# Patient Record
Sex: MALE | Race: WHITE | NOT HISPANIC OR LATINO | Employment: FULL TIME | URBAN - METROPOLITAN AREA
[De-identification: names, ages, dates, MRNs, and addresses within clinical notes are randomized per-mention and may not be internally consistent; named-entity substitution may affect disease eponyms.]

---

## 2020-06-02 ENCOUNTER — TRANSCRIBE ORDERS (OUTPATIENT)
Dept: ADMINISTRATIVE | Facility: HOSPITAL | Age: 35
End: 2020-06-02

## 2020-06-02 ENCOUNTER — HOSPITAL ENCOUNTER (OUTPATIENT)
Dept: RADIOLOGY | Facility: HOSPITAL | Age: 35
Discharge: HOME | End: 2020-06-02
Payer: COMMERCIAL

## 2020-06-02 DIAGNOSIS — M79.89 SWELLING OF LIMB: ICD-10-CM

## 2020-06-02 DIAGNOSIS — M79.89 SWELLING OF LIMB: Primary | ICD-10-CM

## 2020-06-02 PROCEDURE — 73140 X-RAY EXAM OF FINGER(S): CPT

## 2020-07-23 ENCOUNTER — OFFICE VISIT (OUTPATIENT)
Dept: URGENT CARE | Facility: CLINIC | Age: 35
End: 2020-07-23
Payer: COMMERCIAL

## 2020-07-23 VITALS
BODY MASS INDEX: 21.73 KG/M2 | HEART RATE: 84 BPM | HEIGHT: 71 IN | DIASTOLIC BLOOD PRESSURE: 60 MMHG | WEIGHT: 155.2 LBS | SYSTOLIC BLOOD PRESSURE: 106 MMHG | RESPIRATION RATE: 18 BRPM | OXYGEN SATURATION: 99 % | TEMPERATURE: 98 F

## 2020-07-23 DIAGNOSIS — J02.9 SORE THROAT: ICD-10-CM

## 2020-07-23 DIAGNOSIS — R07.89 CHEST TIGHTNESS: Primary | ICD-10-CM

## 2020-07-23 LAB — S PYO AG THROAT QL: NEGATIVE

## 2020-07-23 PROCEDURE — 87880 STREP A ASSAY W/OPTIC: CPT | Performed by: PHYSICIAN ASSISTANT

## 2020-07-23 PROCEDURE — 99202 OFFICE O/P NEW SF 15 MIN: CPT | Performed by: PHYSICIAN ASSISTANT

## 2020-07-23 PROCEDURE — U0003 INFECTIOUS AGENT DETECTION BY NUCLEIC ACID (DNA OR RNA); SEVERE ACUTE RESPIRATORY SYNDROME CORONAVIRUS 2 (SARS-COV-2) (CORONAVIRUS DISEASE [COVID-19]), AMPLIFIED PROBE TECHNIQUE, MAKING USE OF HIGH THROUGHPUT TECHNOLOGIES AS DESCRIBED BY CMS-2020-01-R: HCPCS | Performed by: PHYSICIAN ASSISTANT

## 2020-07-23 RX ORDER — DOXYCYCLINE HYCLATE 100 MG
TABLET ORAL 2 TIMES DAILY
COMMUNITY
Start: 2020-07-23 | End: 2022-03-18 | Stop reason: ALTCHOICE

## 2020-07-23 RX ORDER — UREA 10 %
250 LOTION (ML) TOPICAL DAILY
COMMUNITY
End: 2022-03-18 | Stop reason: ALTCHOICE

## 2020-07-23 RX ORDER — CETIRIZINE HYDROCHLORIDE 10 MG/1
10 TABLET ORAL DAILY PRN
COMMUNITY

## 2020-07-23 RX ORDER — LOTEPREDNOL ETABONATE 3.8 MG/G
GEL OPHTHALMIC 3 TIMES DAILY
COMMUNITY
Start: 2020-07-22 | End: 2022-03-18 | Stop reason: ALTCHOICE

## 2020-07-23 NOTE — PATIENT INSTRUCTIONS
Please remain at home in quarantine until you receive the results of your COVID-19 testing and further instruction given  While at home you should isolate from others  You may take Tylenol for fever and discomfort  Get plenty of rest and drink lots of water  Delsym or Mucinex DM may be taken for cough and congestion relief  Chloraseptic spray, saltwater gargles, warm tea with honey, and throat lozenges may be relieving of throat discomfort  Call for your test results, which should be back in 7-10 days  Follow up with your family doctor in 3-5 days  Proceed to the ER if symptoms worsen  If you need to go to the ER please notify them of your arrival and wait in your car until further instruction is given  If you need to call 911 please notify the  of current suspicion for COVID-19

## 2020-07-23 NOTE — PROGRESS NOTES
[facility]        NAME: Inge Bejarano is a 30 y o  male  : 1985    MRN: [id number]  DATE: 2020  TIME: 6:10 PM    Assessment and Plan   Sore throat [J02 9]  1  Sore throat  POCT rapid strepA   2  Chest tightness  MISC COVID-19 TEST- Office Collection     Rapid strep negative in office  COVID-19 swab obtained  Quarantine measures reviewed  Reviewed supportive therapies  Instructed to call for results  Work note provided  All questions answered  Precautions given  Patient verbalized understanding and agreement with the plan  Patient Instructions     Please remain at home in quarantine until you receive the results of your COVID-19 testing and further instruction given  While at home you should isolate from others  You may take Tylenol for fever and discomfort  Get plenty of rest and drink lots of water  Delsym or Mucinex DM may be taken for cough and congestion relief  Chloraseptic spray, saltwater gargles, warm tea with honey, and throat lozenges may be relieving of throat discomfort  Call for your test results, which should be back in 7-10 days  Follow up with your family doctor in 3-5 days  Proceed to the ER if symptoms worsen  If you need to go to the ER please notify them of your arrival and wait in your car until further instruction is given  If you need to call 911 please notify the  of current suspicion for COVID-19  Chief Complaint     Chief Complaint   Patient presents with    Sore Throat     Had sore throat since   Today started with chills and sl  chest tightness with deep breath with body aches and MOORE  Took Advil at 8 am for temp 100  Denies  cough, SOB, loss of taste or smell  or N/V or diarrhea   Started Doxycycline today for potential Lyme's disease - had 6 deer ticks imbedded 2020    Generalized Body Aches         History of Present Illness       45-year-old male accompanied by his wife presenting with complaint chest tightness since yesterday  States he awoke yesterday morning with chills and fever, T-max 100 degrees  Reports malaise, generalized body aches, and headache  States he has had a sore throat for the past 5 days has been constant  No swelling or difficulty swallowing  Has been treating for discomfort symptoms with Advil with some relief  States he started taking doxycycline 100 milligrams b i d  For concern for lyme disease, reporting he had multiple tick bites roughly 11 days ago  However, his wife developed similar flu like symptoms today and is now concerned for COVID-19  He denies any sweats, fatigue, shortness of breath, wheezing, cough, abdominal pains, nausea, vomiting, or diarrhea  No recent travel  No other sick contacts  Review of Systems   Review of Systems   Constitutional: Positive for chills and fever  Negative for diaphoresis and fatigue  HENT: Positive for sore throat  Negative for congestion, ear pain and rhinorrhea  Respiratory: Positive for chest tightness  Negative for cough, shortness of breath and wheezing  Cardiovascular: Negative for chest pain and palpitations  Gastrointestinal: Negative for abdominal pain, diarrhea, nausea and vomiting  Musculoskeletal: Positive for myalgias  Neurological: Positive for headaches  Negative for dizziness       Current Medications       Current Outpatient Medications:     cetirizine (ZyrTEC) 10 mg tablet, Take 10 mg by mouth daily as needed for allergies, Disp: , Rfl:     doxycycline hyclate (VIBRA-TABS) 100 mg tablet, 2 (two) times a day, Disp: , Rfl:     LOTEMAX SM 0 38 % GEL, Administer to both eyes 3 (three) times a day, Disp: , Rfl:     magnesium gluconate (MAGONATE) 500 mg tablet, Take 250 mg by mouth daily, Disp: , Rfl:     Current Allergies     Allergies as of 07/23/2020    (No Known Allergies)            The following portions of the patient's history were reviewed and updated as appropriate: allergies, current medications, past family history, past medical history, past social history, past surgical history and problem list      Past Medical History:   Diagnosis Date    Allergic rhinitis     Frequent headaches        Past Surgical History:   Procedure Laterality Date    WISDOM TOOTH EXTRACTION         No family history on file  Medications have been verified  Objective   /60   Pulse 84   Temp 98 °F (36 7 °C)   Resp 18   Ht 5' 10 75" (1 797 m)   Wt 70 4 kg (155 lb 3 2 oz)   SpO2 99%   BMI 21 80 kg/m²      Rapid strep:  Negative  Physical Exam     Physical Exam   Constitutional: Vital signs are normal  He appears well-developed and well-nourished  He is cooperative  He does not appear ill  No distress  HENT:   Head: Normocephalic and atraumatic  Right Ear: Hearing, tympanic membrane, external ear and ear canal normal    Left Ear: Hearing, tympanic membrane, external ear and ear canal normal    Nose: Nose normal    Mouth/Throat: Uvula is midline, oropharynx is clear and moist and mucous membranes are normal  Mucous membranes are not pale, not dry and not cyanotic  No oral lesions  No uvula swelling  No oropharyngeal exudate, posterior oropharyngeal edema, posterior oropharyngeal erythema or tonsillar abscesses  Eyes: Conjunctivae and lids are normal  Right eye exhibits no discharge and no exudate  Left eye exhibits no discharge and no exudate  Neck: Trachea normal and phonation normal  Neck supple  No tracheal tenderness present  No neck rigidity  No edema and no erythema present  Cardiovascular: Normal rate, regular rhythm and normal heart sounds  Exam reveals no distant heart sounds  Pulmonary/Chest: Effort normal and breath sounds normal  No stridor  No respiratory distress  He has no decreased breath sounds  He has no wheezes  He has no rhonchi  He has no rales  Lymphadenopathy:     He has no cervical adenopathy  Neurological: He is alert  He is not disoriented  No cranial nerve deficit  Coordination and gait normal    Skin: Skin is warm, dry and intact  No rash noted  He is not diaphoretic  No erythema  No pallor  Psychiatric: He has a normal mood and affect  His behavior is normal  Judgment and thought content normal    Nursing note and vitals reviewed

## 2020-07-23 NOTE — LETTER
July 23, 2020     Patient: Leai Maldonado   YOB: 1985   Date of Visit: 7/23/2020       To Whom It May Concern: It is my medical opinion that Shirley Quiroga should remain out of work until cleared by a physician  If you have any questions or concerns, please don't hesitate to call           Sincerely,        Shaun Higgins PA-C

## 2020-07-26 LAB — SARS-COV-2 RNA SPEC QL NAA+PROBE: NOT DETECTED

## 2021-03-10 DIAGNOSIS — Z23 ENCOUNTER FOR IMMUNIZATION: ICD-10-CM

## 2021-12-15 ENCOUNTER — EVALUATION (OUTPATIENT)
Dept: PHYSICAL THERAPY | Facility: CLINIC | Age: 36
End: 2021-12-15
Payer: COMMERCIAL

## 2021-12-15 DIAGNOSIS — M54.2 NECK PAIN: Primary | ICD-10-CM

## 2021-12-15 PROCEDURE — 97162 PT EVAL MOD COMPLEX 30 MIN: CPT | Performed by: PHYSICAL THERAPIST

## 2021-12-22 ENCOUNTER — OFFICE VISIT (OUTPATIENT)
Dept: PHYSICAL THERAPY | Facility: CLINIC | Age: 36
End: 2021-12-22
Payer: COMMERCIAL

## 2021-12-22 DIAGNOSIS — M54.2 NECK PAIN: Primary | ICD-10-CM

## 2021-12-22 PROCEDURE — 97140 MANUAL THERAPY 1/> REGIONS: CPT | Performed by: PHYSICAL THERAPIST

## 2021-12-22 PROCEDURE — 97110 THERAPEUTIC EXERCISES: CPT | Performed by: PHYSICAL THERAPIST

## 2021-12-29 ENCOUNTER — OFFICE VISIT (OUTPATIENT)
Dept: PHYSICAL THERAPY | Facility: CLINIC | Age: 36
End: 2021-12-29
Payer: COMMERCIAL

## 2021-12-29 DIAGNOSIS — M54.2 NECK PAIN: Primary | ICD-10-CM

## 2021-12-29 PROCEDURE — 97140 MANUAL THERAPY 1/> REGIONS: CPT | Performed by: PHYSICAL THERAPIST

## 2021-12-29 PROCEDURE — 97110 THERAPEUTIC EXERCISES: CPT | Performed by: PHYSICAL THERAPIST

## 2022-01-05 ENCOUNTER — OFFICE VISIT (OUTPATIENT)
Dept: PHYSICAL THERAPY | Facility: CLINIC | Age: 37
End: 2022-01-05
Payer: COMMERCIAL

## 2022-01-05 DIAGNOSIS — M54.2 NECK PAIN: Primary | ICD-10-CM

## 2022-01-05 PROCEDURE — 97110 THERAPEUTIC EXERCISES: CPT | Performed by: PHYSICAL THERAPIST

## 2022-01-05 PROCEDURE — 97140 MANUAL THERAPY 1/> REGIONS: CPT | Performed by: PHYSICAL THERAPIST

## 2022-01-05 NOTE — PROGRESS NOTES
Daily Note     Today's date: 2022  Patient name: Tory Ruiz  : 1985  MRN: [id number]  Referring provider: Kitty Vo MD  Dx:   Encounter Diagnosis     ICD-10-CM    1  Neck pain  M54 2                   Subjective: Pain after last session increased and was sore for a few days following  He feels traction was possibly the cause of the increased pain  He also notes that each morning when he wakes up following sleeping on his side, his neck is really stiff and sore  Objective: See treatment diary below      Assessment: Tolerated treatment well  Patient would benefit from continued PT  He would benefit from C-S extension re-training  Plan: Continue per plan of care  Progress treatment as tolerated  Assess CT junction       Precautions: - none    Specialty Daily Treatment Diary     Manuals 12/15/21 12/22/21 12/29/21 1/5/22    Visit # 1 2 3 4    STM UT, paraspinals, levator  6' 6' 7'    C-S PROM  3' 3' 3'    C-S distraction  3' 3' 2'    T-S P to A        Warm-up          10' 5' 10'    Neuro Re-Ed        Posture correct        UT stretch  10 ea 10 ea     Chin tucks 20 20 20 20    S/L rotation 10 10      Flex iso    20    Ther Ex        TB row  20    BTB  30   GTB    TB ext  20   BTB  30   GTB    Shrugs   20   4# 30   4#    Towel SNAGs   20 20     LS stretch pulleys   10 ea 10    SCM stretch supine   10 ea ---                    Ther Activity                                Modalities        Traction to C-S  10' max 25#  min 10# 8' max 30#  Min 15# ---

## 2022-01-11 ENCOUNTER — OFFICE VISIT (OUTPATIENT)
Dept: PHYSICAL THERAPY | Facility: CLINIC | Age: 37
End: 2022-01-11
Payer: COMMERCIAL

## 2022-01-11 DIAGNOSIS — M54.2 NECK PAIN: Primary | ICD-10-CM

## 2022-01-11 PROCEDURE — 97140 MANUAL THERAPY 1/> REGIONS: CPT | Performed by: PHYSICAL THERAPIST

## 2022-01-11 PROCEDURE — 97110 THERAPEUTIC EXERCISES: CPT | Performed by: PHYSICAL THERAPIST

## 2022-01-11 NOTE — PROGRESS NOTES
Daily Note     Today's date: 2022  Patient name: Tawny Montana  : 1985  MRN: [id number]  Referring provider: Lucio David MD  Dx:   Encounter Diagnosis     ICD-10-CM    1  Neck pain  M54 2                   Subjective: He reports feeling improved following last session  He has mild symptoms today  Objective: See treatment diary below      Assessment: Tolerated treatment well  Patient would benefit from continued PT  He has a hinge point in cervical region at C4  He also has significant paraspinal tightness surrounding this segment      Plan: Continue per plan of care  Progress treatment as tolerated  FOTO next session        Precautions: - none    Specialty Daily Treatment Diary     Manuals 12/15/21 12/22/21 12/29/21 1/5/22 1/10/22   Visit # 1 2 3 4 5   STM UT, paraspinals, levator  6' 6' 7' 7'   C-S PROM  3' 3' 3' 3'   C-S distraction  3' 3' 2' 2'   T-S P to A        Warm-up          10' 5' 10' 10'   Neuro Re-Ed        Posture correct        UT stretch  10 ea 10 ea     Chin tucks 20 20 20 20 20 w lift off in supine   S/L rotation 10 10      Flex iso    20 20   Ther Ex        TB row  20    BTB  30   GTB 30  GTB   TB ext  20   BTB  30   GTB 30  GTB   Shrugs   20   4# 30   4# 30   5#   Towel SNAGs   20 20     LS stretch pulleys   10 ea 10 --   SCM stretch supine   10 ea ---                    Ther Activity                                Modalities        Traction to C-S  10' max 25#  min 10# 8' max 30#  Min 15# --- --

## 2022-01-14 ENCOUNTER — OFFICE VISIT (OUTPATIENT)
Dept: PHYSICAL THERAPY | Facility: CLINIC | Age: 37
End: 2022-01-14
Payer: COMMERCIAL

## 2022-01-14 DIAGNOSIS — M54.2 NECK PAIN: Primary | ICD-10-CM

## 2022-01-14 PROCEDURE — 97140 MANUAL THERAPY 1/> REGIONS: CPT | Performed by: PHYSICAL THERAPIST

## 2022-01-14 PROCEDURE — 97110 THERAPEUTIC EXERCISES: CPT | Performed by: PHYSICAL THERAPIST

## 2022-01-14 NOTE — PROGRESS NOTES
Daily Note     Today's date: 2022  Patient name: Jelani Noland  : 1985  MRN: [id number]  Referring provider: Greg Boykin MD  Dx:   Encounter Diagnosis     ICD-10-CM    1  Neck pain  M54 2                   Subjective: He reports driving golf balls recently and having a headache following a few hits  Objective: See treatment diary below      Assessment: Tolerated treatment well  Patient would benefit from continued PT  He positioned himself with good hip hinge but had to flex his C-S to look at golf ball  Adjusted patient into mild squat stance which allowed hime to see ball and not flex C-S  Plan: Continue per plan of care  Progress treatment as tolerated           Precautions: - none    Specialty Daily Treatment Diary     Manuals 22       Visit # 6       STM UT, paraspinals, levator 7'       C-S PROM 3'       C-S distraction 2'       T-S P to A        Warm-up         10'       Neuro Re-Ed        Posture correct        UT stretch        Chin tucks with lift off in supine 20       S/L rotation        Flex iso        Ther Ex        TB row 20   BTB       TB ext 20   BTB       Shrugs        Towel SNAGs         LS stretch pulleys        SCM stretch supine                        Ther Activity        Golf swing mechanics 8'                       Modalities        Traction to C-S  10' max 25#  min 10# 8' max 30#  Min 15# --- --

## 2022-01-18 ENCOUNTER — APPOINTMENT (OUTPATIENT)
Dept: PHYSICAL THERAPY | Facility: CLINIC | Age: 37
End: 2022-01-18
Payer: COMMERCIAL

## 2022-01-21 ENCOUNTER — APPOINTMENT (OUTPATIENT)
Dept: PHYSICAL THERAPY | Facility: CLINIC | Age: 37
End: 2022-01-21
Payer: COMMERCIAL

## 2022-01-25 ENCOUNTER — OFFICE VISIT (OUTPATIENT)
Dept: PHYSICAL THERAPY | Facility: CLINIC | Age: 37
End: 2022-01-25
Payer: COMMERCIAL

## 2022-01-25 DIAGNOSIS — M54.2 NECK PAIN: Primary | ICD-10-CM

## 2022-01-25 PROCEDURE — 97140 MANUAL THERAPY 1/> REGIONS: CPT | Performed by: PHYSICAL THERAPIST

## 2022-01-25 PROCEDURE — 97110 THERAPEUTIC EXERCISES: CPT | Performed by: PHYSICAL THERAPIST

## 2022-01-25 NOTE — PROGRESS NOTES
Daily Note     Today's date: 2022  Patient name: Marcos Baldwin  : 1985  MRN: [id number]  Referring provider: Estefany Dowd MD  Dx:   Encounter Diagnosis     ICD-10-CM    1  Neck pain  M54 2                   Subjective: He reports no significant pain today  He is sleeping better due to not using a pillow  Objective: See treatment diary below      Assessment: Tolerated treatment well  Patient would benefit from continued PT  C-S PROM normalizing well  Plan: Continue per plan of care  Progress treatment as tolerated           Precautions: - none    Specialty Daily Treatment Diary     Manuals 22      Visit # 6 7      STM UT, paraspinals, levator 7' 7'      C-S PROM 3' 3'      C-S distraction 2' 2'      T-S P to A        Warm-up         10' 10'      Neuro Re-Ed        Posture correct        UT stretch        Chin tucks with lift off in supine 20 20      S/L rotation        Flex iso        Ther Ex        TB row 20   BTB 20 green tube      TB ext 20   BTB 20 green tube      Shrugs  20  10#      Towel SNAGs         LS stretch pulleys        SCM stretch supine        Over head press  20  9# kb              Ther Activity        Golf swing mechanics 8'                       Modalities        Traction to C-S  --

## 2022-01-27 ENCOUNTER — OFFICE VISIT (OUTPATIENT)
Dept: PHYSICAL THERAPY | Facility: CLINIC | Age: 37
End: 2022-01-27
Payer: COMMERCIAL

## 2022-01-27 DIAGNOSIS — M54.2 NECK PAIN: Primary | ICD-10-CM

## 2022-01-27 PROCEDURE — 97140 MANUAL THERAPY 1/> REGIONS: CPT | Performed by: PHYSICAL THERAPIST

## 2022-01-27 PROCEDURE — 97110 THERAPEUTIC EXERCISES: CPT | Performed by: PHYSICAL THERAPIST

## 2022-01-27 NOTE — PROGRESS NOTES
Daily Note     Today's date: 2022  Patient name: Randa Rouse  : 1985  MRN: [id number]  Referring provider: Mattie Locke MD  Dx:   Encounter Diagnosis     ICD-10-CM    1  Neck pain  M54 2                   Subjective: He reports no C-S pain  Some stiffness still as he wakes up  Objective: See treatment diary below      Assessment: Tolerated treatment well  Patient would benefit from continued PT  He had a good posterior C-S stretch with posture correction and chin tuck  He agreed to add this to his HEP  Plan: Continue per plan of care  Progress treatment as tolerated           Precautions: - none    Specialty Daily Treatment Diary     Manuals 22     Visit # 6 7 8     STM UT, paraspinals, levator 7' 7' 7'     C-S PROM 3' 3' 3'     C-S distraction 2' 2' 2'     T-S P to A   2'     Warm-up         10' 10' 10'     Neuro Re-Ed        Posture correct        UT stretch        Chin tucks with lift off in supine 20 20 20 at wall with posture correction / brace     S/L rotation        Flex iso        Ther Ex        TB row 20   BTB 20 green tube 20  GTB  H ABD     TB ext 20   BTB 20 green tube 20  GTB     Shrugs  20  10# 20  15#     Towel SNAGs         LS stretch pulleys        SCM stretch supine        Over head press  20  9# kb              Ther Activity        Golf swing mechanics 8'                       Modalities        Traction to C-S  --

## 2022-02-01 ENCOUNTER — OFFICE VISIT (OUTPATIENT)
Dept: PHYSICAL THERAPY | Facility: CLINIC | Age: 37
End: 2022-02-01
Payer: COMMERCIAL

## 2022-02-01 DIAGNOSIS — M54.2 NECK PAIN: Primary | ICD-10-CM

## 2022-02-01 PROCEDURE — 97110 THERAPEUTIC EXERCISES: CPT | Performed by: PHYSICAL THERAPIST

## 2022-02-01 PROCEDURE — 97140 MANUAL THERAPY 1/> REGIONS: CPT | Performed by: PHYSICAL THERAPIST

## 2022-02-01 NOTE — PROGRESS NOTES
Daily Note     Today's date: 2022  Patient name: Duane Lea  : 1985  MRN: [id number]  Referring provider: Jigar Castellanos MD  Dx:   Encounter Diagnosis     ICD-10-CM    1  Neck pain  M54 2                   Subjective: He reports waking up stiff and sore in his C-S today  Pain is 1/10  He also notes feeling left upper shoulder / neck pinching when he picks up his young son  Objective: See treatment diary below      Assessment: Tolerated treatment well  Patient would benefit from continued PT  Haroon Delgadillo had increased left sided pain/ tightness at C-S shoulder junction  Performed first rib depression mob with improvement in restriction and pain following  Plan: Continue per plan of care  Progress treatment as tolerated  He is attempting to drive golf balls tonight  Assess if a mild squat in his driving stance helps C-S symptoms       Precautions: - none    Specialty Daily Treatment Diary     Manuals 22    Visit # 6 7 8 9    STM UT, paraspinals, levator 7' 7' 7' 7'    C-S PROM 3' 3' 3' 3'    C-S distraction 2' 2' 2' 2'    T-S P to A   2' 2'    Warm-up         10' 10' 10' 10'    Neuro Re-Ed        Posture correct        UT stretch        Chin tucks with lift off in supine 20 20 20 at wall with posture correction / brace 20    S/L rotation        Flex iso        Ther Ex        TB row 20   BTB 20 green tube 20  GTB  H ABD     TB ext 20   BTB 20 green tube 20  GTB     Shrugs  20  10# 20  15# 20  15#    Towel SNAGs         Open books w TB    20  RTB    SCM stretch supine        Over head press  20  9# kb              Ther Activity        Golf swing mechanics 8'                       Modalities        Traction to C-S  --

## 2022-02-04 ENCOUNTER — OFFICE VISIT (OUTPATIENT)
Dept: PHYSICAL THERAPY | Facility: CLINIC | Age: 37
End: 2022-02-04
Payer: COMMERCIAL

## 2022-02-04 DIAGNOSIS — M54.2 NECK PAIN: Primary | ICD-10-CM

## 2022-02-04 PROCEDURE — 97140 MANUAL THERAPY 1/> REGIONS: CPT | Performed by: PHYSICAL THERAPIST

## 2022-02-04 PROCEDURE — 97110 THERAPEUTIC EXERCISES: CPT | Performed by: PHYSICAL THERAPIST

## 2022-02-04 NOTE — PROGRESS NOTES
Daily Note     Today's date: 2022  Patient name: Duane Lea  : 1985  MRN: [id number]  Referring provider: Jigar Castellanos MD  Dx:   Encounter Diagnosis     ICD-10-CM    1  Neck pain  M54 2                   Subjective: He reports feeling good today but having some soreness the last few days  He admits this may be from golfing on Tuesday but notes he was able to golf pain free  Objective: See treatment diary below      Assessment: Tolerated treatment well  Patient would benefit from continued PT  Added extension of T-S and C-S in prone with a press up  He completed this without pain  Plan: Continue per plan of care  Progress treatment as tolerated           Precautions: - none    Specialty Daily Treatment Diary     Manuals 22   Visit # 6 7 8 9 10   STM UT, paraspinals, levator 7' 7' 7' 7' 7'   C-S PROM 3' 3' 3' 3' 3'   C-S distraction 2' 2' 2' 2' 2'   T-S P to A   2' 2' 2'   Warm-up         10' 10' 10' 10' 10'   Neuro Re-Ed        Posture correct        UT stretch        Chin tucks with lift off in supine 20 20 20 at wall with posture correction / brace 20 20   S/L rotation        Flex iso        Ther Ex        TB row 20   BTB 20 green tube 20  GTB  H ABD  20  GTB   TB ext 20   BTB 20 green tube 20  GTB  20  GTB   Shrugs  20  10# 20  15# 20  15# 20  15#   Towel SNAGs         Open books w TB    20  RTB    SCM stretch supine     Prone P-ups 10x   Over head press  20  9# kb              Ther Activity        Golf swing mechanics 8'                       Modalities        Traction to C-S  --

## 2022-02-08 ENCOUNTER — OFFICE VISIT (OUTPATIENT)
Dept: PHYSICAL THERAPY | Facility: CLINIC | Age: 37
End: 2022-02-08
Payer: COMMERCIAL

## 2022-02-08 DIAGNOSIS — M54.2 NECK PAIN: Primary | ICD-10-CM

## 2022-02-08 PROCEDURE — 97110 THERAPEUTIC EXERCISES: CPT | Performed by: PHYSICAL THERAPIST

## 2022-02-08 PROCEDURE — 97140 MANUAL THERAPY 1/> REGIONS: CPT | Performed by: PHYSICAL THERAPIST

## 2022-02-08 NOTE — PROGRESS NOTES
Daily Note     Today's date: 2022  Patient name: Duane Lea  : 1985  MRN: [id number]  Referring provider: Jigar Castellanos MD  Dx:   Encounter Diagnosis     ICD-10-CM    1  Neck pain  M54 2                   Subjective: No complaints of neck pain today      Objective: See treatment diary below      Assessment: Tolerated treatment well  Patient would benefit from continued PT  Haroon Delgadillo has remaining forward shoulder posture which stays in an anterior position when he is lying supine  Plan: Continue per plan of care  Progress treatment as tolerated      Add pupine pec stretches and pec minor stretch in door way     Precautions: - none    Specialty Daily Treatment Diary     Manuals 22       Visit # 11       STM UT, paraspinals, levator 7'       C-S PROM 3'       C-S distraction 2'       T-S P to A 2'       Warm-up        MH 10'       Neuro Re-Ed        Chin tucks with lift off in supine        S/L rotation                Ther Ex        TB row 20   BTB       TB ext 20   BTB       Shrugs        Pec stretch supine        Open books w TB HABD  20x   GTB       Pec stretch in doorway        Over head press                Ther Activity        Golf swing mechanics                        Modalities

## 2022-02-10 ENCOUNTER — OFFICE VISIT (OUTPATIENT)
Dept: PHYSICAL THERAPY | Facility: CLINIC | Age: 37
End: 2022-02-10
Payer: COMMERCIAL

## 2022-02-10 DIAGNOSIS — M54.2 NECK PAIN: Primary | ICD-10-CM

## 2022-02-10 PROCEDURE — 97140 MANUAL THERAPY 1/> REGIONS: CPT | Performed by: PHYSICAL THERAPIST

## 2022-02-10 PROCEDURE — 97110 THERAPEUTIC EXERCISES: CPT | Performed by: PHYSICAL THERAPIST

## 2022-02-10 NOTE — PROGRESS NOTES
Daily Note     Today's date: 2/10/2022  Patient name: Tyree Segura  : 1985  MRN: [id number]  Referring provider: Jluis Davies MD  Dx:   Encounter Diagnosis     ICD-10-CM    1  Neck pain  M54 2                   Subjective: He had one episode of C-S stiffness and there fore soreness when he woke up earlier this week  Objective: See treatment diary below      Assessment: Tolerated treatment well  Patient would benefit from continued PT  Making steady progress toward goals  Plan: Continue per plan of care  Progress treatment as tolerated           Precautions: - none    Specialty Daily Treatment Diary     Manuals 2/8/22 2/10/22      Visit # 11 12      STM UT, paraspinals, levator 7' 7'      C-S PROM 3' 3'      C-S distraction 2' 2'      T-S P to A 2' 2'      Warm-up         10' 10'      Neuro Re-Ed        Chin tucks with lift off in supine        S/L rotation                Ther Ex        TB row 20   BTB 20   BTB      TB ext 20   BTB 20   BTB      Shrugs  20  15# db      Pec stretch supine        Open books w TB HABD  20x   GTB HABD  20x   GTB      Pec stretch in doorway        Over head press          Cat/Camel 10x      Ther Activity        Golf swing mechanics                        Modalities

## 2022-02-15 ENCOUNTER — OFFICE VISIT (OUTPATIENT)
Dept: PHYSICAL THERAPY | Facility: CLINIC | Age: 37
End: 2022-02-15
Payer: COMMERCIAL

## 2022-02-15 DIAGNOSIS — M54.2 NECK PAIN: Primary | ICD-10-CM

## 2022-02-15 PROCEDURE — 97140 MANUAL THERAPY 1/> REGIONS: CPT | Performed by: PHYSICAL THERAPIST

## 2022-02-15 PROCEDURE — 97110 THERAPEUTIC EXERCISES: CPT | Performed by: PHYSICAL THERAPIST

## 2022-02-15 NOTE — PROGRESS NOTES
Daily Note     Today's date: 2/15/2022  Patient name: Suhail Juares  : 1985  MRN: [id number]  Referring provider: Garrick Santa MD  Dx:   Encounter Diagnosis     ICD-10-CM    1  Neck pain  M54 2                   Subjective: He had pain this morning when he woke up  Objective: See treatment diary below      Assessment: Tolerated treatment well  Patient would benefit from continued PT  He had soreness with side bend left, side glide right  He also had soreness with combined shoulder depression and retraction  He agreed to add pec minor stretch into HEP    Plan: Continue per plan of care  Progress treatment as tolerated           Precautions: - none    Specialty Daily Treatment Diary     Manuals 2/8/22 2/10/22 2/15/22     Visit # 11 12 13     STM UT, paraspinals, levator 7' 7' 7'     C-S PROM 3' 3' 3'     C-S distraction 2' 2' 2'     T-S P to A 2' 2' 2'     Warm-up         10' 10' 10'     Neuro Re-Ed        Chin tucks with lift off in supine   20     S/L rotation                Ther Ex        TB row 20   BTB 20   BTB      TB ext 20   BTB 20   BTB      Shrugs  20  15# db 20  15#     Pec stretch supine        Open books w TB HABD  20x   GTB HABD  20x   GTB 20     Pec stretch in doorway   10 x 10"     Over head press   20  5#       Cat/Camel 10x      Ther Activity        Golf swing mechanics                        Modalities

## 2022-02-18 ENCOUNTER — OFFICE VISIT (OUTPATIENT)
Dept: PHYSICAL THERAPY | Facility: CLINIC | Age: 37
End: 2022-02-18
Payer: COMMERCIAL

## 2022-02-18 DIAGNOSIS — M54.2 NECK PAIN: Primary | ICD-10-CM

## 2022-02-18 PROCEDURE — 97140 MANUAL THERAPY 1/> REGIONS: CPT | Performed by: PHYSICAL THERAPIST

## 2022-02-18 PROCEDURE — 97110 THERAPEUTIC EXERCISES: CPT | Performed by: PHYSICAL THERAPIST

## 2022-02-18 NOTE — PROGRESS NOTES
Daily Note     Today's date: 2022  Patient name: Surendra López  : 1985  MRN: [id number]  Referring provider: Paola Miguel MD  Dx:   Encounter Diagnosis     ICD-10-CM    1  Neck pain  M54 2                   Subjective: A little pain yesterday      Objective: See treatment diary below      Assessment: Tolerated treatment well  Patient would benefit from continued PT  He is able to feel a good stretch with postural correction and Habd of arms  Plan: Continue per plan of care  Progress treatment as tolerated       Attempt more active program next session     Precautions: - none    Specialty Daily Treatment Diary     Manuals 2/8/22 2/10/22 2/15/22 2/17/22    Visit # 11 12 13 14    STM UT, paraspinals, levator 7' 7' 7' 7'    C-S PROM 3' 3' 3' 3'    C-S distraction 2' 2' 2' 2'    T-S P to A 2' 2' 2' 2'    Warm-up         10' 10' 10' 10'    Neuro Re-Ed        Chin tucks with lift off in supine   20 20    S/L rotation                Ther Ex        TB row 20   BTB 20   BTB      TB ext 20   BTB 20   BTB      Shrugs  20  15# db 20  15# 20  15#    Pec stretch supine    HAbd  RTB  15x    Open books w TB HABD  20x   GTB HABD  20x   GTB 20     Pec stretch in doorway   10 x 10"     Over head press   20  5#       Cat/Camel 10x  20    Ther Activity        Golf swing mechanics            LPD in high kneel 27 5#  20x            Modalities

## 2022-02-22 ENCOUNTER — OFFICE VISIT (OUTPATIENT)
Dept: PHYSICAL THERAPY | Facility: CLINIC | Age: 37
End: 2022-02-22
Payer: COMMERCIAL

## 2022-02-22 DIAGNOSIS — M54.2 NECK PAIN: Primary | ICD-10-CM

## 2022-02-22 PROCEDURE — 97110 THERAPEUTIC EXERCISES: CPT

## 2022-02-22 PROCEDURE — 97140 MANUAL THERAPY 1/> REGIONS: CPT

## 2022-02-22 PROCEDURE — 97112 NEUROMUSCULAR REEDUCATION: CPT

## 2022-02-22 NOTE — PROGRESS NOTES
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 and anticipates that he will be sore after doing this  Objective: See treatment diary below; performed P-A thoracic glide grade 5 after receiving verbal informed consent from pt; pt presented with cavitation in the mid-thoracic segment; presented with lasting hypomobility in 1-2 segments above    Assessment: Pt tolerated treatment well  Pt performed active warm up and tolerated well, without additional pain or soreness in cervical musculature  Pt performed functional activities include shoulder ER with arm abducted to 90° as well as simulated golf swing  Pt also performed farmer's carries with verbal cuing for scap retraction  Pt noted after performing shrugging and carries, his posterior neck was sore  Plan: Continue per plan of care  Progress treatment as tolerated            Precautions: - none    Specialty Daily Treatment Diary     Manuals 2/8/22 2/10/22 2/15/22 2/17/22 2/22/22   Visit # 11 12 13 14 15   STM UT, paraspinals, levator 7' 7' 7' 7' 7'   C-S PROM 3' 3' 3' 3' 3'   C-S distraction 2' 2' 2' 2' 2'   T-S P to A 2' 2' 2' 2' Test glide followed by gr 5 for 3'   Warm-up        MH 10' 10' 10' 10' Nustep 10'    Neuro Re-Ed        Chin tucks with lift off in supine   20 20 20x    S/L rotation        Hubbard's carry with scap retract     50 ft x 2 (15# DB x 2)    Ther Ex        TB row 20   BTB 20   BTB   20x 12 5# (NMR)    TB ext 20   BTB 20   BTB   20x 9# (NMR)   Shrugs  20  15# db 20  15# 20  15# 20x 15#   Pec stretch supine    HAbd  RTB  15x HAbd GTB 20x supine (NMR)   Open books w TB HABD  20x   GTB HABD  20x   GTB 20 Pec stretch in doorway   10 x 10"     Over head press   20  5#       Cat/Camel 10x  20    Shoulder ER arm at 90°     20x 4#                    Ther Activity        Golf swing mechanics     W/ CC 2 5# 2x10        LPD in high kneel 27 5#  20x            Modalities

## 2022-02-24 ENCOUNTER — OFFICE VISIT (OUTPATIENT)
Dept: PHYSICAL THERAPY | Facility: CLINIC | Age: 37
End: 2022-02-24
Payer: COMMERCIAL

## 2022-02-24 DIAGNOSIS — M54.2 NECK PAIN: Primary | ICD-10-CM

## 2022-02-24 PROCEDURE — 97140 MANUAL THERAPY 1/> REGIONS: CPT | Performed by: PHYSICAL THERAPIST

## 2022-02-24 PROCEDURE — 97110 THERAPEUTIC EXERCISES: CPT | Performed by: PHYSICAL THERAPIST

## 2022-02-24 NOTE — PROGRESS NOTES
Daily Note      Today's date: 2022  Patient name: Stevie Horne  : 1985  MRN: [id number]  Referring provider: Sirena Juárez MD  Dx:   Encounter Diagnosis     ICD-10-CM    1  Neck pain  M54 2        Subjective: Pt reports that he feels at least 75% better overall  He returns to work next week and feels ready to perform exercises independently at home    Objective: See treatment diary below    Assessment: Amber Lazcano has met all PT goals at this time    Plan: Discontinue PT at this time  Precautions: - none    Specialty Daily Treatment Diary     Manuals 2/10/22 2/15/22 2/17/22 2/22/22 2/24/22   Visit # 12 13 14 15 16   STM UT, paraspinals, levator 7' 7' 7' 7' 7'   C-S PROM 3' 3' 3' 3' 3'   C-S distraction 2' 2' 2' 2' 2'   T-S P to A 2' 2' 2' Test glide followed by gr 5 for 3' T-S ext P to A mob 2'   Warm-up         10' 10' 10' Nustep 10'  5'   Neuro Re-Ed        Chin tucks with lift off in supine  20 20 20x  20   S/L rotation        Hubbard's carry with scap retract    50 ft x 2 (15# DB x 2)     Ther Ex        TB row 20   BTB   20x 12 5# (NMR)     TB ext 20   BTB   20x 9# (NMR)    Shrugs 20  15# db 20  15# 20  15# 20x 15#    Pec stretch supine   HAbd  RTB  15x HAbd GTB 20x supine (NMR)    Open books w TB HABD  20x   GTB 20   20   Pec stretch in doorway  10 x 10"   10   Over head press  20  5#   CC Chops 20  7 5#    Cat/Camel 10x  20     Shoulder ER arm at 90°    20x 4#  Ball throws  20        Bat swings  20           Ther Activity        Golf swing mechanics    W/ CC 2 5# 2x10        LPD in high kneel 27 5#  20x             Modalities             5' MH post                Access Code: [email or web address]  URL: [email or web address]  Date: 2022  Prepared by:  Bridgette Jacobsen    Exercises  · Standing Bilateral Low Shoulder Row with Anchored Resistance - 1 x daily - 3 sets - 10 reps  · Shoulder Extension with Resistance - 1 x daily - 3 sets - 10 reps  · Standing Shoulder Shrugs - 1 x daily - 3 sets - 10 reps  · Doorway Pec Stretch at 60 Elevation - 1 x daily - 3 sets - 10 reps  · Quadruped Thoracic Rotation - Reach Under - 1 x daily - 1 sets - 10 reps  · Quadruped Cat Camel - 1 x daily - 3 sets - 10 reps  · Supine Deep Neck Flexor Training - Repetitions - 1 x daily - 3 sets - 10 reps  · Seated Cervical Sidebending Stretch - 1 x daily - 1 sets - 10 reps

## 2022-02-24 NOTE — PROGRESS NOTES
PT Discharge    Patient has done well with PT  He feels at least 75% better overall  Patient has achieved all goals at this time and will continue to perform HEP independently  He was given copies of the exercises and a thera-band  D/C at this time

## 2022-02-25 ENCOUNTER — APPOINTMENT (OUTPATIENT)
Dept: PHYSICAL THERAPY | Facility: CLINIC | Age: 37
End: 2022-02-25
Payer: COMMERCIAL

## 2022-03-01 ENCOUNTER — APPOINTMENT (OUTPATIENT)
Dept: RADIOLOGY | Facility: CLINIC | Age: 37
End: 2022-03-01
Payer: COMMERCIAL

## 2022-03-01 ENCOUNTER — OFFICE VISIT (OUTPATIENT)
Dept: OBGYN CLINIC | Facility: CLINIC | Age: 37
End: 2022-03-01
Payer: COMMERCIAL

## 2022-03-01 VITALS
DIASTOLIC BLOOD PRESSURE: 79 MMHG | HEART RATE: 73 BPM | TEMPERATURE: 97.5 F | BODY MASS INDEX: 23.1 KG/M2 | HEIGHT: 71 IN | SYSTOLIC BLOOD PRESSURE: 117 MMHG | WEIGHT: 165 LBS

## 2022-03-01 DIAGNOSIS — M54.2 NECK PAIN: ICD-10-CM

## 2022-03-01 DIAGNOSIS — M54.2 CHRONIC NECK PAIN: Primary | ICD-10-CM

## 2022-03-01 DIAGNOSIS — G89.29 CHRONIC NECK PAIN: Primary | ICD-10-CM

## 2022-03-01 PROCEDURE — 99203 OFFICE O/P NEW LOW 30 MIN: CPT | Performed by: ORTHOPAEDIC SURGERY

## 2022-03-01 PROCEDURE — 72040 X-RAY EXAM NECK SPINE 2-3 VW: CPT

## 2022-03-01 NOTE — PROGRESS NOTES
Assessment/Plan:  1  Chronic neck pain  XR spine cervical 2 or 3 vw injury    MRI cervical spine wo contrast       Mary Valero has ongoing neck pain and has not responded to conservative measures of physical therapy or chiropractic treatment  He has failed 6 weeks of conservative physical therapy and his x-rays do not show any clear abnormality today  I recommended an MRI of the cervical spine for further evaluation at this time  He will follow up after the MRI  Subjective:   Surendra López is a 31 y o  male who presents to the office for evaluation for chronic neck pain  He as been experiencing neck discomfort for the past several months  He denies any injury or trauma and states that he has been feeling discomfort at the center portion of his cervical spine that radiates up into the base of his skull  It worsens with motion and improves with rest   He did try chiropractic treatment which has not helped  He also recently underwent 6 weeks of formal physical therapy which he states did not help him with his pain  He continues to have a chronic aching throbbing pain in his cervical region that radiates to the base of his skull  He denies any numbness or tingling radiating down his arm  He feels a sensation of fullness within his neck" and has a constant sensation that he needs to crack his neck  Review of Systems   Constitutional: Negative for chills, fever and unexpected weight change  HENT: Negative for hearing loss, nosebleeds and sore throat  Eyes: Negative for pain, redness and visual disturbance  Respiratory: Negative for cough, shortness of breath and wheezing  Cardiovascular: Negative for chest pain, palpitations and leg swelling  Gastrointestinal: Negative for abdominal pain, nausea and vomiting  Endocrine: Negative for polyphagia and polyuria  Genitourinary: Negative for dysuria and hematuria  Musculoskeletal:        See HPI   Skin: Negative for rash and wound  Neurological: Negative for dizziness, numbness and headaches  Psychiatric/Behavioral: Negative for decreased concentration and suicidal ideas  The patient is not nervous/anxious  Past Medical History:   Diagnosis Date    Allergic rhinitis     Frequent headaches        Past Surgical History:   Procedure Laterality Date    WISDOM TOOTH EXTRACTION         No family history on file  Social History     Occupational History    Not on file   Tobacco Use    Smoking status: Never Smoker    Smokeless tobacco: Never Used   Substance and Sexual Activity    Alcohol use: Yes     Alcohol/week: 7 0 standard drinks     Types: 7 Cans of beer per week    Drug use: Never    Sexual activity: Not on file         Current Outpatient Medications:     cetirizine (ZyrTEC) 10 mg tablet, Take 10 mg by mouth daily as needed for allergies, Disp: , Rfl:     doxycycline hyclate (VIBRA-TABS) 100 mg tablet, 2 (two) times a day (Patient not taking: Reported on 3/1/2022 ), Disp: , Rfl:     LOTEMAX SM 0 38 % GEL, Administer to both eyes 3 (three) times a day (Patient not taking: Reported on 3/1/2022 ), Disp: , Rfl:     magnesium gluconate (MAGONATE) 500 mg tablet, Take 250 mg by mouth daily (Patient not taking: Reported on 3/1/2022 ), Disp: , Rfl:     No Known Allergies    Objective:  Vitals:    03/01/22 1354   BP: 117/79   Pulse: 73   Temp: 97 5 °F (36 4 °C)       Ortho Exam    Physical Exam  Vitals and nursing note reviewed  Constitutional:       Appearance: He is well-developed  HENT:      Head: Normocephalic and atraumatic  Eyes:      General: No scleral icterus  Conjunctiva/sclera: Conjunctivae normal    Neck:        Comments: Negative Spurling's maneuver bilaterally   Full strength and sensation bilateral extremities  Cardiovascular:      Rate and Rhythm: Normal rate  Pulmonary:      Effort: Pulmonary effort is normal  No respiratory distress     Musculoskeletal:      Cervical back: Normal range of motion and neck supple  No rigidity  Spinous process tenderness and muscular tenderness present  Normal range of motion  Comments: As noted in HPI   Skin:     General: Skin is warm and dry  Neurological:      Mental Status: He is alert and oriented to person, place, and time  Psychiatric:         Behavior: Behavior normal          I have personally reviewed pertinent films in PACS and my interpretation is as follows:   Three-view x-rays of the cervical spine demonstrates no evidence of acute fracture or significant degenerative changes

## 2022-03-11 ENCOUNTER — HOSPITAL ENCOUNTER (OUTPATIENT)
Dept: RADIOLOGY | Facility: HOSPITAL | Age: 37
Discharge: HOME | End: 2022-03-11
Attending: ORTHOPAEDIC SURGERY
Payer: COMMERCIAL

## 2022-03-11 DIAGNOSIS — G89.29 CHRONIC NECK PAIN: ICD-10-CM

## 2022-03-11 DIAGNOSIS — M54.2 CHRONIC NECK PAIN: ICD-10-CM

## 2022-03-11 PROCEDURE — G1004 CDSM NDSC: HCPCS

## 2022-03-11 PROCEDURE — 72141 MRI NECK SPINE W/O DYE: CPT

## 2022-03-17 ENCOUNTER — OFFICE VISIT (OUTPATIENT)
Dept: OBGYN CLINIC | Facility: CLINIC | Age: 37
End: 2022-03-17
Payer: COMMERCIAL

## 2022-03-17 VITALS
WEIGHT: 165 LBS | DIASTOLIC BLOOD PRESSURE: 71 MMHG | TEMPERATURE: 97 F | HEIGHT: 71 IN | SYSTOLIC BLOOD PRESSURE: 108 MMHG | BODY MASS INDEX: 23.1 KG/M2 | HEART RATE: 77 BPM

## 2022-03-17 DIAGNOSIS — G89.29 CHRONIC NECK PAIN: Primary | ICD-10-CM

## 2022-03-17 DIAGNOSIS — M54.2 CHRONIC NECK PAIN: Primary | ICD-10-CM

## 2022-03-17 DIAGNOSIS — M50.20 PROTRUSION OF CERVICAL INTERVERTEBRAL DISC: ICD-10-CM

## 2022-03-17 PROCEDURE — 99213 OFFICE O/P EST LOW 20 MIN: CPT | Performed by: ORTHOPAEDIC SURGERY

## 2022-03-17 NOTE — PROGRESS NOTES
Assessment/Plan:  1  Chronic neck pain  Ambulatory referral to Pain Management   2  Protrusion of cervical intervertebral disc  Ambulatory referral to Pain Management       Layla Christensen has chronic cervical neck pain and MRI demonstrating disc protrusion at 2 levels in the cervical spine  This does not seem to appear to compress any spinal cord level however that may be contributing to his chronic pain  He also appears to have pain along the trapezius musculature bilaterally  I referred him to pain management to consider an epidural injection versus a trigger point injection at this time  Subjective:   Tristen Taylor is a 31 y o  male who presents to the office for follow up for cervical neck pain  He has been experiencing chronic neck pain for several months and failed conservative measures of physical therapy  The pain radiates into the base of his skull and down to his shoulders bilaterally  He does not have any radicular symptoms and notes 10 down his arms  At last visit he was sent for an MRI and he returns today  He states his pain is the same  Review of Systems   Constitutional: Negative for chills, fever and unexpected weight change  HENT: Negative for hearing loss, nosebleeds and sore throat  Eyes: Negative for pain, redness and visual disturbance  Respiratory: Negative for cough, shortness of breath and wheezing  Cardiovascular: Negative for chest pain, palpitations and leg swelling  Gastrointestinal: Negative for abdominal pain, nausea and vomiting  Endocrine: Negative for polyphagia and polyuria  Genitourinary: Negative for dysuria and hematuria  Musculoskeletal:        See HPI   Skin: Negative for rash and wound  Neurological: Negative for dizziness, numbness and headaches  Psychiatric/Behavioral: Negative for decreased concentration and suicidal ideas  The patient is not nervous/anxious            Past Medical History:   Diagnosis Date    Allergic rhinitis     Frequent headaches        Past Surgical History:   Procedure Laterality Date    WISDOM TOOTH EXTRACTION         No family history on file  Social History     Occupational History    Not on file   Tobacco Use    Smoking status: Never Smoker    Smokeless tobacco: Never Used   Substance and Sexual Activity    Alcohol use: Yes     Alcohol/week: 7 0 standard drinks     Types: 7 Cans of beer per week    Drug use: Never    Sexual activity: Not on file         Current Outpatient Medications:     cetirizine (ZyrTEC) 10 mg tablet, Take 10 mg by mouth daily as needed for allergies (Patient not taking: Reported on 3/17/2022 ), Disp: , Rfl:     doxycycline hyclate (VIBRA-TABS) 100 mg tablet, 2 (two) times a day (Patient not taking: Reported on 3/1/2022 ), Disp: , Rfl:     LOTEMAX SM 0 38 % GEL, Administer to both eyes 3 (three) times a day (Patient not taking: Reported on 3/1/2022 ), Disp: , Rfl:     magnesium gluconate (MAGONATE) 500 mg tablet, Take 250 mg by mouth daily (Patient not taking: Reported on 3/1/2022 ), Disp: , Rfl:     No Known Allergies    Objective:  Vitals:    03/17/22 1506   BP: 108/71   Pulse: 77   Temp: (!) 97 °F (36 1 °C)       Ortho Exam    Physical Exam  Neck:        Comments: Tenderness over trapezius  Musculoskeletal:      Cervical back: Muscular tenderness present  No spinous process tenderness  Normal range of motion           I have personally reviewed pertinent films in PACS and my interpretation is as follows:  MRI of the cervical spine demonstrates disc protrusion at C5-6 and C6-7

## 2022-03-18 ENCOUNTER — OFFICE VISIT (OUTPATIENT)
Dept: URGENT CARE | Facility: CLINIC | Age: 37
End: 2022-03-18
Payer: COMMERCIAL

## 2022-03-18 VITALS — TEMPERATURE: 97.6 F | OXYGEN SATURATION: 99 % | HEART RATE: 90 BPM | RESPIRATION RATE: 14 BRPM

## 2022-03-18 DIAGNOSIS — J06.9 VIRAL URI WITH COUGH: Primary | ICD-10-CM

## 2022-03-18 PROCEDURE — 99213 OFFICE O/P EST LOW 20 MIN: CPT | Performed by: PHYSICIAN ASSISTANT

## 2022-03-18 PROCEDURE — 0241U HB NFCT DS VIR RESP RNA 4 TRGT: CPT | Performed by: PHYSICIAN ASSISTANT

## 2022-03-18 RX ORDER — FLUTICASONE PROPIONATE 50 MCG
1 SPRAY, SUSPENSION (ML) NASAL DAILY
Qty: 18.2 ML | Refills: 0 | Status: SHIPPED | OUTPATIENT
Start: 2022-03-18

## 2022-03-18 RX ORDER — PSEUDOEPHEDRINE HCL 120 MG/1
120 TABLET, FILM COATED, EXTENDED RELEASE ORAL 2 TIMES DAILY
Qty: 14 TABLET | Refills: 0 | Status: SHIPPED | OUTPATIENT
Start: 2022-03-18

## 2022-03-18 NOTE — PATIENT INSTRUCTIONS
COVID/Flu swab performed in office, results to be in and 1-3 days, quarantine until results are in, isolation requirements provided  Continue over-the-counter ibuprofen/ Tylenol as needed for symptoms  Adequate rest and fluid hydration are recommended  Follow-up PCP  Return or be seen in ER with any progressing worsening symptoms  Patient understands and is agreeable with this plan

## 2022-03-18 NOTE — LETTER
[facility] Bernie Rizvi  [facility] [facility] [address]  [phone]  Dept: [phone]    March 18, 2022    Patient: Tyree Segura  YOB: 1985    Tyree Segura was seen and evaluated at our [facility]  Please note if Covid and Flu tests are negative, they may return to work when fever free for 24 hours without the use of a fever reducing agent  If Covid or Flu test is positive, they may return to work on 03/21/2022, as this is 5 days from the onset of symptoms  Upon return, they must then adhere to strict masking for an additional 5 days      Sincerely,    Ynes Myles PA-C

## 2022-03-18 NOTE — PROGRESS NOTES
[facility]        NAME: Kevin Meredith is a 31 y o  male  : 1985    MRN: [id number]  DATE: 2022  TIME: 1:35 PM    Assessment and Plan   Viral URI with cough [J06 9]  1  Viral URI with cough  fluticasone (FLONASE) 50 mcg/act nasal spray    pseudoephedrine (SUDAFED) 120 MG 12 hr tablet    COVID/FLU/RSV         Patient Instructions   Patient Instructions   COVID/Flu swab performed in office, results to be in and 1-3 days, quarantine until results are in, isolation requirements provided  Continue over-the-counter ibuprofen/ Tylenol as needed for symptoms  Adequate rest and fluid hydration are recommended  Follow-up PCP  Return or be seen in ER with any progressing worsening symptoms  Patient understands and is agreeable with this plan  Follow up with PCP in 3-5 days  Proceed to  ER if symptoms worsen  Chief Complaint     Chief Complaint   Patient presents with    Cold Like Symptoms     pt presents with fever, head congestion, headache, chills; started two days ago         History of Present Illness       Patient is a 47-year-old male presenting today with cold symptoms x2 days  Patient notes he recently got over a sinus infection, was seen by his PCP 2 weeks ago and started on azithromycin, notes that he finished antibiotic last week and felt some resolution of his symptoms, notes however 2 days ago he was experiencing some nasal congestion, postnasal drip and a sore throat as well as a temperature ranging up to 101° F, has been taking over-the-counter DayQuil and NyQuil which she states provides mild resolution of his symptoms  Notes that he took an at home COVID test yesterday which was negative  Denies any known sick contacts or positive exposures to COVID-19 or flu  Denies trouble swallowing, SOB, chest tightness, N/V/D  Review of Systems   Review of Systems   Constitutional: Positive for fever  Negative for chills     HENT: Positive for congestion, postnasal drip and sore throat  Negative for ear pain, sinus pressure and trouble swallowing  Eyes: Negative for pain  Respiratory: Positive for cough  Negative for chest tightness and shortness of breath  Cardiovascular: Negative for chest pain  Gastrointestinal: Negative for abdominal pain  Musculoskeletal: Negative for myalgias  Skin: Negative for rash  Neurological: Negative for headaches  Current Medications       Current Outpatient Medications:     cetirizine (ZyrTEC) 10 mg tablet, Take 10 mg by mouth daily as needed for allergies  , Disp: , Rfl:     fluticasone (FLONASE) 50 mcg/act nasal spray, 1 spray into each nostril daily, Disp: 18 2 mL, Rfl: 0    pseudoephedrine (SUDAFED) 120 MG 12 hr tablet, Take 1 tablet (120 mg total) by mouth 2 (two) times a day, Disp: 14 tablet, Rfl: 0    Current Allergies     Allergies as of 03/18/2022    (No Known Allergies)            The following portions of the patient's history were reviewed and updated as appropriate: allergies, current medications, past family history, past medical history, past social history, past surgical history and problem list      Past Medical History:   Diagnosis Date    Allergic rhinitis     Frequent headaches        Past Surgical History:   Procedure Laterality Date    WISDOM TOOTH EXTRACTION         History reviewed  No pertinent family history  Medications have been verified  Objective   Pulse 90   Temp 97 6 °F (36 4 °C)   Resp 14   SpO2 99%        Physical Exam     Physical Exam  Vitals reviewed  Constitutional:       General: He is not in acute distress  Appearance: Normal appearance  He is not ill-appearing  HENT:      Head: Normocephalic and atraumatic  Right Ear: Tympanic membrane, ear canal and external ear normal       Left Ear: Tympanic membrane, ear canal and external ear normal       Nose: Congestion present  Right Turbinates: Swollen and pale        Left Turbinates: Swollen and pale  Right Sinus: No maxillary sinus tenderness or frontal sinus tenderness  Left Sinus: No maxillary sinus tenderness or frontal sinus tenderness  Comments: Inflamed nasal mucosa     Mouth/Throat:      Mouth: Mucous membranes are moist       Comments: Mild erythema of pharynx, findings consistent with postnasal drip, no tonsillar swelling erythema or exudate, uvula midline  Eyes:      Conjunctiva/sclera: Conjunctivae normal       Pupils: Pupils are equal, round, and reactive to light  Cardiovascular:      Rate and Rhythm: Normal rate and regular rhythm  Pulses: Normal pulses  Heart sounds: Normal heart sounds  Pulmonary:      Effort: Pulmonary effort is normal       Breath sounds: Normal breath sounds  Musculoskeletal:      Cervical back: Normal range of motion  No tenderness  Lymphadenopathy:      Cervical: No cervical adenopathy  Skin:     General: Skin is warm  Capillary Refill: Capillary refill takes less than 2 seconds  Neurological:      General: No focal deficit present  Mental Status: He is alert and oriented to person, place, and time

## 2022-03-19 LAB
FLUAV RNA RESP QL NAA+PROBE: NEGATIVE
FLUBV RNA RESP QL NAA+PROBE: NEGATIVE
RSV RNA RESP QL NAA+PROBE: NEGATIVE
SARS-COV-2 RNA RESP QL NAA+PROBE: NEGATIVE

## 2023-03-13 ENCOUNTER — HOSPITAL ENCOUNTER (EMERGENCY)
Facility: HOSPITAL | Age: 38
Discharge: HOME | End: 2023-03-14
Attending: EMERGENCY MEDICINE

## 2023-03-13 ENCOUNTER — APPOINTMENT (EMERGENCY)
Dept: RADIOLOGY | Facility: HOSPITAL | Age: 38
End: 2023-03-13

## 2023-03-13 ENCOUNTER — OFFICE VISIT (OUTPATIENT)
Dept: URGENT CARE | Facility: CLINIC | Age: 38
End: 2023-03-13

## 2023-03-13 VITALS
OXYGEN SATURATION: 97 % | TEMPERATURE: 97.1 F | BODY MASS INDEX: 23.91 KG/M2 | RESPIRATION RATE: 14 BRPM | WEIGHT: 169 LBS | HEART RATE: 67 BPM

## 2023-03-13 DIAGNOSIS — R10.32 LEFT LOWER QUADRANT ABDOMINAL PAIN: Primary | ICD-10-CM

## 2023-03-13 DIAGNOSIS — K63.89 EPIPLOIC APPENDAGITIS: ICD-10-CM

## 2023-03-13 DIAGNOSIS — R10.32 LLQ PAIN: Primary | ICD-10-CM

## 2023-03-13 LAB
ALBUMIN SERPL BCP-MCNC: 4.2 G/DL (ref 3.5–5)
ALP SERPL-CCNC: 64 U/L (ref 34–104)
ALT SERPL W P-5'-P-CCNC: 50 U/L (ref 7–52)
ANION GAP SERPL CALCULATED.3IONS-SCNC: 7 MMOL/L (ref 4–13)
AST SERPL W P-5'-P-CCNC: 24 U/L (ref 13–39)
BASOPHILS # BLD AUTO: 0.07 THOUSANDS/ÂΜL (ref 0–0.1)
BASOPHILS NFR BLD AUTO: 1 % (ref 0–1)
BILIRUB SERPL-MCNC: 0.35 MG/DL (ref 0.2–1)
BILIRUB UR QL STRIP: NEGATIVE
BUN SERPL-MCNC: 21 MG/DL (ref 5–25)
CALCIUM SERPL-MCNC: 8.8 MG/DL (ref 8.4–10.2)
CHLORIDE SERPL-SCNC: 103 MMOL/L (ref 96–108)
CLARITY UR: CLEAR
CO2 SERPL-SCNC: 30 MMOL/L (ref 21–32)
COLOR UR: NORMAL
CREAT SERPL-MCNC: 1.01 MG/DL (ref 0.6–1.3)
EOSINOPHIL # BLD AUTO: 0.17 THOUSAND/ÂΜL (ref 0–0.61)
EOSINOPHIL NFR BLD AUTO: 2 % (ref 0–6)
ERYTHROCYTE [DISTWIDTH] IN BLOOD BY AUTOMATED COUNT: 11.9 % (ref 11.6–15.1)
GFR SERPL CREATININE-BSD FRML MDRD: 95 ML/MIN/1.73SQ M
GLUCOSE SERPL-MCNC: 92 MG/DL (ref 65–140)
GLUCOSE UR STRIP-MCNC: NEGATIVE MG/DL
HCT VFR BLD AUTO: 46.5 % (ref 36.5–49.3)
HGB BLD-MCNC: 16.3 G/DL (ref 12–17)
HGB UR QL STRIP.AUTO: NEGATIVE
IMM GRANULOCYTES # BLD AUTO: 0.02 THOUSAND/UL (ref 0–0.2)
IMM GRANULOCYTES NFR BLD AUTO: 0 % (ref 0–2)
KETONES UR STRIP-MCNC: NEGATIVE MG/DL
LEUKOCYTE ESTERASE UR QL STRIP: NEGATIVE
LIPASE SERPL-CCNC: 29 U/L (ref 11–82)
LYMPHOCYTES # BLD AUTO: 3.07 THOUSANDS/ÂΜL (ref 0.6–4.47)
LYMPHOCYTES NFR BLD AUTO: 36 % (ref 14–44)
MCH RBC QN AUTO: 31.7 PG (ref 26.8–34.3)
MCHC RBC AUTO-ENTMCNC: 35.1 G/DL (ref 31.4–37.4)
MCV RBC AUTO: 91 FL (ref 82–98)
MONOCYTES # BLD AUTO: 0.83 THOUSAND/ÂΜL (ref 0.17–1.22)
MONOCYTES NFR BLD AUTO: 10 % (ref 4–12)
NEUTROPHILS # BLD AUTO: 4.35 THOUSANDS/ÂΜL (ref 1.85–7.62)
NEUTS SEG NFR BLD AUTO: 51 % (ref 43–75)
NITRITE UR QL STRIP: NEGATIVE
NRBC BLD AUTO-RTO: 0 /100 WBCS
PH UR STRIP.AUTO: 6 [PH]
PLATELET # BLD AUTO: 211 THOUSANDS/UL (ref 149–390)
PMV BLD AUTO: 11.1 FL (ref 8.9–12.7)
POTASSIUM SERPL-SCNC: 3.7 MMOL/L (ref 3.5–5.3)
PROT SERPL-MCNC: 7.3 G/DL (ref 6.4–8.4)
PROT UR STRIP-MCNC: NEGATIVE MG/DL
RBC # BLD AUTO: 5.14 MILLION/UL (ref 3.88–5.62)
SODIUM SERPL-SCNC: 140 MMOL/L (ref 135–147)
SP GR UR STRIP.AUTO: 1.02 (ref 1–1.03)
UROBILINOGEN UR QL STRIP.AUTO: 0.2 E.U./DL
WBC # BLD AUTO: 8.51 THOUSAND/UL (ref 4.31–10.16)

## 2023-03-13 RX ADMIN — IOHEXOL 100 ML: 350 INJECTION, SOLUTION INTRAVENOUS at 22:34

## 2023-03-13 NOTE — PROGRESS NOTES
[facility]        NAME: Tyrese Allison is a 30 y o  male  : 1985    MRN: [id number]  DATE: 2023  TIME: 7:49 PM    Assessment and Plan   LLQ pain [R10 32]  1  LLQ pain  Transfer to other facility            Patient Instructions   Patient Instructions   Recommend you go to the emergency room with the symptoms  Follow up with PCP in 3-5 days  Proceed to  ER if symptoms worsen  Chief Complaint     Chief Complaint   Patient presents with   • Abdominal Pain     Pt presents with lower ABD pain that started on the left side, now lower middle and right sided pain; started two weeks ago; bloating, discomfort with flatulence; last BM yesterday normal form          History of Present Illness       Mona Ordonez is a 31yo M with no significant PMHx presenting at urgent care for worsening abd pain x2wks  Pain started in LLQ 2wks ago and was tender to touch but relieved with no medication  Pain returned this past Friday starting in LLQ and now radiating to periumbilical area  Pt denies radiation to the groin or any palpable lumps in groin  Pt endorses lack of appetite and feeling satiated  Pt denied CP, SOB, cough, N/V/D, hematochezia/melena  Pt has not taken anything for pain but reports that coughing, jogging, quickly standing from sitting all exacerbate the pain  Pain is now 5/10 in severity  Denies alcohol or drug use  Review of Systems   Review of Systems   Constitutional: Positive for appetite change  Negative for activity change, chills, diaphoresis and fever  HENT: Negative for congestion, ear pain, rhinorrhea and sore throat  Eyes: Negative for pain and visual disturbance  Respiratory: Negative for cough, chest tightness and shortness of breath  Cardiovascular: Negative for chest pain and palpitations  Gastrointestinal: Positive for abdominal distention (bloating ) and abdominal pain (LLQ)   Negative for anal bleeding, blood in stool, constipation, diarrhea, nausea and vomiting  Genitourinary: Negative for dysuria and hematuria  Musculoskeletal: Negative for arthralgias, back pain and myalgias  Skin: Negative for color change, pallor and rash  Neurological: Negative for seizures, syncope and headaches  All other systems reviewed and are negative  Current Medications       Current Outpatient Medications:   •  cetirizine (ZyrTEC) 10 mg tablet, Take 10 mg by mouth daily as needed for allergies   (Patient not taking: Reported on 3/13/2023), Disp: , Rfl:   •  fluticasone (FLONASE) 50 mcg/act nasal spray, 1 spray into each nostril daily (Patient not taking: Reported on 3/13/2023), Disp: 18 2 mL, Rfl: 0  •  pseudoephedrine (SUDAFED) 120 MG 12 hr tablet, Take 1 tablet (120 mg total) by mouth 2 (two) times a day (Patient not taking: Reported on 3/13/2023), Disp: 14 tablet, Rfl: 0    Current Allergies     Allergies as of 03/13/2023   • (No Known Allergies)            The following portions of the patient's history were reviewed and updated as appropriate: allergies, current medications, past family history, past medical history, past social history, past surgical history and problem list      Past Medical History:   Diagnosis Date   • Allergic rhinitis    • Frequent headaches        Past Surgical History:   Procedure Laterality Date   • WISDOM TOOTH EXTRACTION         History reviewed  No pertinent family history  Medications have been verified  Objective   Pulse 67   Temp (!) 97 1 °F (36 2 °C)   Resp 14   Wt 76 7 kg (169 lb)   SpO2 97%   BMI 23 91 kg/m²        Physical Exam     Physical Exam  Vitals and nursing note reviewed  Constitutional:       General: He is not in acute distress  Appearance: Normal appearance  He is well-developed and normal weight  He is not ill-appearing, toxic-appearing or diaphoretic  HENT:      Head: Normocephalic and atraumatic  Nose: Nose normal  No congestion or rhinorrhea     Cardiovascular:      Rate and Rhythm: Normal rate and regular rhythm  Heart sounds: Normal heart sounds  No murmur heard  No friction rub  No gallop  Pulmonary:      Effort: Pulmonary effort is normal  No respiratory distress  Breath sounds: Normal breath sounds  No stridor  No wheezing, rhonchi or rales  Chest:      Chest wall: No tenderness  Abdominal:      General: Abdomen is flat  Bowel sounds are normal  There is no distension  Palpations: Abdomen is soft  Tenderness: There is abdominal tenderness in the right lower quadrant and left lower quadrant  There is rebound  There is no guarding  Negative signs include Mcnally's sign, Rovsing's sign and psoas sign  Musculoskeletal:      Cervical back: Normal range of motion  Lymphadenopathy:      Cervical: No cervical adenopathy  Skin:     General: Skin is warm and dry  Capillary Refill: Capillary refill takes less than 2 seconds  Neurological:      Mental Status: He is alert

## 2023-03-14 ENCOUNTER — CONSULT (OUTPATIENT)
Dept: GASTROENTEROLOGY | Facility: CLINIC | Age: 38
End: 2023-03-14

## 2023-03-14 VITALS
HEART RATE: 70 BPM | WEIGHT: 166 LBS | SYSTOLIC BLOOD PRESSURE: 120 MMHG | HEIGHT: 70 IN | BODY MASS INDEX: 23.77 KG/M2 | DIASTOLIC BLOOD PRESSURE: 65 MMHG

## 2023-03-14 VITALS
HEART RATE: 70 BPM | TEMPERATURE: 97.8 F | OXYGEN SATURATION: 98 % | SYSTOLIC BLOOD PRESSURE: 120 MMHG | DIASTOLIC BLOOD PRESSURE: 65 MMHG | RESPIRATION RATE: 16 BRPM

## 2023-03-14 DIAGNOSIS — Z80.0 FAMILY HISTORY OF COLON CANCER: ICD-10-CM

## 2023-03-14 DIAGNOSIS — Z86.010 HISTORY OF COLON POLYPS: ICD-10-CM

## 2023-03-14 DIAGNOSIS — K63.89 EPIPLOIC APPENDAGITIS: Primary | ICD-10-CM

## 2023-03-14 PROBLEM — Z86.0100 HISTORY OF COLON POLYPS: Status: ACTIVE | Noted: 2023-03-14

## 2023-03-14 RX ORDER — NAPROXEN 500 MG/1
500 TABLET ORAL 2 TIMES DAILY WITH MEALS
Qty: 30 TABLET | Refills: 0 | Status: SHIPPED | OUTPATIENT
Start: 2023-03-14

## 2023-03-14 NOTE — ED PROVIDER NOTES
History  Chief Complaint   Patient presents with   • Abdominal Pain     C/o lower abd pain, primarily on L side, intermittent over several weeks but constant over past 4 days  Just came from urgent care, not associated with vomitng diarrhea or constipation  Feels bloated     Patient presents for evaluation of left lower quadrant and suprapubic pain  Patient states he had a little bit of pain in the same area about a week ago but it resolved on its own  Over the past weekend it started as intermittent pain became more constant  Went to urgent care today and was referred to the ER for evaluation  Denies any nausea vomiting or diarrhea  Moving his bowels normal this weekend  Feels bloated  Slight decrease in appetite although no change in the pain when he is eating  No history of prior abdominal surgeries  No prior history of similar pain  Does report that his mother has a history of diverticulitis  History provided by:  Patient   used: No    Abdominal Pain  Associated symptoms: no chest pain, no chills, no cough, no diarrhea, no dysuria, no fever, no hematuria, no nausea, no shortness of breath, no sore throat and no vomiting        Prior to Admission Medications   Prescriptions Last Dose Informant Patient Reported? Taking?    cetirizine (ZyrTEC) 10 mg tablet   Yes No   Sig: Take 10 mg by mouth daily as needed for allergies     Patient not taking: Reported on 3/13/2023   fluticasone (FLONASE) 50 mcg/act nasal spray   No No   Si spray into each nostril daily   Patient not taking: Reported on 3/13/2023   pseudoephedrine (SUDAFED) 120 MG 12 hr tablet   No No   Sig: Take 1 tablet (120 mg total) by mouth 2 (two) times a day   Patient not taking: Reported on 3/13/2023      Facility-Administered Medications: None       Past Medical History:   Diagnosis Date   • Allergic rhinitis    • Frequent headaches        Past Surgical History:   Procedure Laterality Date   • COLONOSCOPY     • WISDOM TOOTH EXTRACTION         History reviewed  No pertinent family history  I have reviewed and agree with the history as documented  E-Cigarette/Vaping   • E-Cigarette Use Never User      E-Cigarette/Vaping Substances     Social History     Tobacco Use   • Smoking status: Light Smoker     Types: Cigars     Passive exposure: Past   • Smokeless tobacco: Never   Vaping Use   • Vaping Use: Never used   Substance Use Topics   • Alcohol use: Yes     Alcohol/week: 7 0 standard drinks     Types: 7 Cans of beer per week   • Drug use: Never       Review of Systems   Constitutional: Positive for appetite change  Negative for chills and fever  HENT: Negative for ear pain and sore throat  Eyes: Negative for pain and visual disturbance  Respiratory: Negative for cough and shortness of breath  Cardiovascular: Negative for chest pain and palpitations  Gastrointestinal: Positive for abdominal pain  Negative for blood in stool, diarrhea, nausea and vomiting  Genitourinary: Negative for dysuria and hematuria  Musculoskeletal: Negative for arthralgias and back pain  Skin: Negative for color change and rash  Neurological: Negative for seizures and syncope  All other systems reviewed and are negative  Physical Exam  Physical Exam  Vitals and nursing note reviewed  Constitutional:       General: He is not in acute distress  HENT:      Head: Atraumatic  Right Ear: External ear normal       Left Ear: External ear normal       Nose: Nose normal       Mouth/Throat:      Mouth: Mucous membranes are moist       Pharynx: Oropharynx is clear  Eyes:      General: No scleral icterus  Conjunctiva/sclera: Conjunctivae normal    Cardiovascular:      Rate and Rhythm: Normal rate and regular rhythm  Pulses: Normal pulses  Pulmonary:      Effort: Pulmonary effort is normal  No respiratory distress  Breath sounds: Normal breath sounds  Abdominal:      General: Abdomen is flat   Bowel sounds are normal  There is no distension  Tenderness: There is abdominal tenderness  There is no guarding or rebound  Musculoskeletal:         General: No deformity  Normal range of motion  Skin:     Findings: No rash  Neurological:      Mental Status: He is alert and oriented to person, place, and time           Vital Signs  ED Triage Vitals [03/13/23 2113]   Temperature Pulse Respirations Blood Pressure SpO2   97 8 °F (36 6 °C) 65 16 117/73 100 %      Temp Source Heart Rate Source Patient Position - Orthostatic VS BP Location FiO2 (%)   Tympanic Monitor Sitting Right arm --      Pain Score       5           Vitals:    03/13/23 2113 03/14/23 0037   BP: 117/73 120/65   Pulse: 65 70   Patient Position - Orthostatic VS: Sitting Sitting         Visual Acuity      ED Medications  Medications   iohexol (OMNIPAQUE) 350 MG/ML injection (SINGLE-DOSE) 100 mL (100 mL Intravenous Given 3/13/23 2234)       Diagnostic Studies  Results Reviewed     Procedure Component Value Units Date/Time    Comprehensive metabolic panel [[id number]] Collected: 03/13/23 2153    Lab Status: Final result Specimen: Blood from Arm, Left Updated: 03/13/23 2216     Sodium 140 mmol/L      Potassium 3 7 mmol/L      Chloride 103 mmol/L      CO2 30 mmol/L      ANION GAP 7 mmol/L      BUN 21 mg/dL      Creatinine 1 01 mg/dL      Glucose 92 mg/dL      Calcium 8 8 mg/dL      AST 24 U/L      ALT 50 U/L      Alkaline Phosphatase 64 U/L      Total Protein 7 3 g/dL      Albumin 4 2 g/dL      Total Bilirubin 0 35 mg/dL      eGFR 95 ml/min/1 73sq m     Narrative:      [place] guidelines for Chronic Kidney Disease (CKD):   •  Stage 1 with normal or high GFR (GFR > 90 mL/min/1 73 square meters)  •  Stage 2 Mild CKD (GFR = 60-89 mL/min/1 73 square meters)  •  Stage 3A Moderate CKD (GFR = 45-59 mL/min/1 73 square meters)  •  Stage 3B Moderate CKD (GFR = 30-44 mL/min/1 73 square meters)  •  Stage 4 Severe CKD (GFR = 15-29 mL/min/1 73 square meters)  •  Stage 5 End Stage CKD (GFR <15 mL/min/1 73 square meters)  Note: GFR calculation is accurate only with a steady state creatinine    Lipase [[id number]]  (Normal) Collected: 03/13/23 2153    Lab Status: Final result Specimen: Blood from Arm, Left Updated: 03/13/23 2216     Lipase 29 u/L     UA (URINE) with reflex to Scope [[id number]] Collected: 03/13/23 2156    Lab Status: Final result Specimen: Urine, Clean Catch Updated: 03/13/23 2204     Color, UA Light Yellow     Clarity, UA Clear     Specific [place], UA 1 020     pH, UA 6 0     Leukocytes, UA Negative     Nitrite, UA Negative     Protein, UA Negative mg/dl      Glucose, UA Negative mg/dl      Ketones, UA Negative mg/dl      Urobilinogen, UA 0 2 E U /dl      Bilirubin, UA Negative     Occult Blood, UA Negative    CBC and differential [[id number]] Collected: 03/13/23 2153    Lab Status: Final result Specimen: Blood from Arm, Left Updated: 03/13/23 2200     WBC 8 51 Thousand/uL      RBC 5 14 Million/uL      Hemoglobin 16 3 g/dL      Hematocrit 46 5 %      MCV 91 fL      MCH 31 7 pg      MCHC 35 1 g/dL      RDW 11 9 %      MPV 11 1 fL      Platelets 948 Thousands/uL      nRBC 0 /100 WBCs      Neutrophils Relative 51 %      Immat GRANS % 0 %      Lymphocytes Relative 36 %      Monocytes Relative 10 %      Eosinophils Relative 2 %      Basophils Relative 1 %      Neutrophils Absolute 4 35 Thousands/µL      Immature Grans Absolute 0 02 Thousand/uL      Lymphocytes Absolute 3 07 Thousands/µL      Monocytes Absolute 0 83 Thousand/µL      Eosinophils Absolute 0 17 Thousand/µL      Basophils Absolute 0 07 Thousands/µL                  CT abdomen pelvis with contrast   Final Result by Giuliano Baugh MD (03/14 0014)      1 5 cm focus of epiploic appendagitis in the left lower quadrant abutting the proximal sigmoid colon noted  This could be causing patient's left lower quadrant abdominal pain  Mild diffuse hepatic steatosis    Trace lower pelvic free fluid could be    physiologic in this young patient  Workstation performed: [id number]                    Procedures  Procedures         ED Course                               SBIRT 20yo+    Flowsheet Row Most Recent Value   SBIRT (23 yo +)    In order to provide better care to our patients, we are screening all of our patients for alcohol and drug use  Would it be okay to ask you these screening questions? Yes Filed at: 03/13/2023 2239   Initial Alcohol Screen: US AUDIT-C     1  How often do you have a drink containing alcohol? 1 Filed at: 03/13/2023 2239   2  How many drinks containing alcohol do you have on a typical day you are drinking? 0 Filed at: 03/13/2023 2239   3a  Male UNDER 65: How often do you have five or more drinks on one occasion? 0 Filed at: 03/13/2023 2239   3b  FEMALE Any Age, or MALE 65+: How often do you have 4 or more drinks on one occassion? 0 Filed at: 03/13/2023 2239   Audit-C Score 1 Filed at: 03/13/2023 2239   SAFIA: How many times in the past year have you    Used an illegal drug or used a prescription medication for non-medical reasons? Never Filed at: 03/13/2023 2239                    Medical Decision Making  Pulse ox 100% on room air indicating adequate oxygenation  Differential diagnosis includes but not limited to diverticulitis, perforated viscus, urinary tract infection, colitis    Patient signed out to next provider Dr Russella Fleischer pending results of CT scan  Amount and/or Complexity of Data Reviewed  Labs: ordered  Radiology: ordered  Risk  Prescription drug management            Disposition  Final diagnoses:   Left lower quadrant abdominal pain   Epiploic appendagitis     Time reflects when diagnosis was documented in both MDM as applicable and the Disposition within this note     Time User Action Codes Description Comment    3/14/2023 12:23 AM Kerry Garcia Add [R10 32] Left lower quadrant abdominal pain     3/14/2023 12:24 AM Kerry Garcia Add [K63 89] Epiploic appendagitis       ED Disposition     ED Disposition   Discharge    Condition   Stable    Date/Time   Tue Mar 14, 2023 12:23 AM    Comment   [address] discharge to home/self care  Follow-up Information     Follow up With Specialties Details Why Contact Info Additional Information    Shari Clark MD  Call in 1 day  [address] [facility] Call in 1 day  [address]  [address]  Davy Pinto [facility], [address] [place] [employer] [facility], [address], B[facility]auCritical access hospitaler[facility] [address]          Discharge Medication List as of 3/14/2023 12:26 AM      START taking these medications    Details   naproxen (Naprosyn) 500 mg tablet Take 1 tablet (500 mg total) by mouth 2 (two) times a day with meals, Starting Tue 3/14/2023, Normal         CONTINUE these medications which have NOT CHANGED    Details   cetirizine (ZyrTEC) 10 mg tablet Take 10 mg by mouth daily as needed for allergies  , Historical Med      fluticasone (FLONASE) 50 mcg/act nasal spray 1 spray into each nostril daily, Starting Fri 3/18/2022, Normal      pseudoephedrine (SUDAFED) 120 MG 12 hr tablet Take 1 tablet (120 mg total) by mouth 2 (two) times a day, Starting Fri 3/18/2022, Normal             No discharge procedures on file      PDMP Review     None          ED Provider  Electronically Signed by           Adalgisa Kamara DO  03/15/23 4169

## 2023-03-14 NOTE — PATIENT INSTRUCTIONS
Scheduled date of colonoscopy (as of today): 07/06/23  Physician performing colonoscopy: JALEN  Location of colonoscopy: [facility]  Bowel prep reviewed with patient: SUZANNE CHAMBERLAIN [facility]  Instructions reviewed with patient by: VIRGINIA  Clearances: NONE

## 2023-03-14 NOTE — LETTER
March 14, 2023     Dana Munoz MD  [facility]    Patient: Bal Robin   YOB: 1985   Date of Visit: 3/14/2023       Dear Dr Jordon Mccormick: Thank you for referring Jeannine Conklin to me for evaluation  Below are my notes for this consultation  If you have questions, please do not hesitate to call me  I look forward to following your patient along with you  Sincerely,        Sharon Lopez MD        CC: No Recipients  Sharon Lopez MD  3/14/2023  2:58 PM  Sign when Signing Visit  Consultation - [facility]  Bal Robin 30 y o  male MRN: [id number]  Unit/Bed#:  Encounter: [id number]        Consults    ASSESSMENT/PLAN:     1  Left lower quadrant pain-likely secondary to epiploic appendagitis  -Continue supportive care  -Increase water intake to 64 ounces daily   -Use NSAIDs on as-needed basis, alternate with acetaminophen  - Discussed the risks of NSAIDs including gastritis/peptic ulcer disease  2   High risk colon cancer screening-has personal history of colon polyps  Reports having undergone colonoscopy 5 to 6 years ago at [facility] which was notable for colon polyps  I do not have these results  Patient also tells me that he has family history of colon cancer in maternal aunt at a young age     -We will schedule increased rescreening colonoscopy given personal history of polyps  I do not have the report however  -MiraLAX/Dulcolax prep instructions given  -Patient was explained about the risks and benefits of the procedure  Risks including but not limited to bleeding, infection, perforation were explained in detail  Also explained about less than 100% sensitivity with the exam and other alternatives        ______________________________________________________________________    Reason for Consult / Principal Problem: [unfilled]    HPI: Bal Robin is a 30y o  year old male presents after recent visit to the emergency room with abdominal pain  Patient reports that he had been experiencing left lower quadrant/suprapubic abdominal pain for few days and ultimately ended up going to the urgent care  He underwent CAT scan which was notable for a 1 5 cm focus of epiploic appendagitis in the left lower quadrant abutting the proximal sigmoid colon  Patient was subsequently discharged home with naproxen and recommended a GI follow-up  Patient reports that he took 1 dose of naproxen this morning and has had mild improvement only  He does tell me that he has had increasing constipation over the past 1 month, reports that he was previously having multiple bowel months on daily basis however over the past 1 month or so has been having only 1 bowel meant on daily basis  He reports that he sometimes also feels as though he is not able to passes gas  Denies any change in medications, no change in diet, no  Loss of appetite, early satiety  He does report family history of colon cancer in his maternal aunt  He reports that he underwent colonoscopy himself 6 years ago at [facility] and was noted to have polyps  He reports that he is overdue for colonoscopy at this time  I do not have these results  He denies any weight loss fortunately  He reports that he has in fact gained weight  Denies any upper GI symptoms including acid reflux, dysphagia, dyne aphasia, loss of appetite or early satiety  Blood work from yesterday in the emergency room is notable for normal CMP, normal CBC, UA was negative  CT of abdomen and pelvis with contrast was notable for 1 5 cm focus of epiploic appendagitis in the left lower quadrant abutting the proximal sigmoid colon  There is also evidence of hepatic steatosis  Review of Systems: The remainder of the review of systems was negative except for the pertinent positives noted in HPI       Historical Information   Past Medical History:   Diagnosis Date   • Allergic rhinitis    • Frequent headaches      Past Surgical History:   Procedure Laterality Date   • WISDOM TOOTH EXTRACTION       Social History   Social History     Substance and Sexual Activity   Alcohol Use Yes   • Alcohol/week: 7 0 standard drinks   • Types: 7 Cans of beer per week     Social History     Substance and Sexual Activity   Drug Use Never     Social History     Tobacco Use   Smoking Status Light Smoker   • Types: Cigars   • Passive exposure: Past   Smokeless Tobacco Never     No family history on file  Meds/Allergies      (Not in a hospital admission)    No current facility-administered medications for this visit  No Known Allergies    Objective      There were no vitals taken for this visit      [unfilled]    PHYSICAL EXAM     GEN: well nourished, well developed, no acute distress  HEENT: anicteric, MMM, no cervical or supraclavicular lymphadenopathy  CV: RRR, no m/r/g  CHEST: CTA b/l, no WRR  ABD: +BS, soft, NT/ND, no hepatosplenomegaly  EXT: no c/c/e  SKIN: no rashes,  NEURO: aaox3    Lab Results:   Admission on 03/13/2023, Discharged on 03/14/2023   Component Date Value   • WBC 03/13/2023 8 51    • RBC 03/13/2023 5 14    • Hemoglobin 03/13/2023 16 3    • Hematocrit 03/13/2023 46 5    • MCV 03/13/2023 91    • MCH 03/13/2023 31 7    • MCHC 03/13/2023 35 1    • RDW 03/13/2023 11 9    • MPV 03/13/2023 11 1    • Platelets 59/14/8049 211    • nRBC 03/13/2023 0    • Neutrophils Relative 03/13/2023 51    • Immat GRANS % 03/13/2023 0    • Lymphocytes Relative 03/13/2023 36    • Monocytes Relative 03/13/2023 10    • Eosinophils Relative 03/13/2023 2    • Basophils Relative 03/13/2023 1    • Neutrophils Absolute 03/13/2023 4 35    • Immature Grans Absolute 03/13/2023 0 02    • Lymphocytes Absolute 03/13/2023 3 07    • Monocytes Absolute 03/13/2023 0 83    • Eosinophils Absolute 03/13/2023 0 17    • Basophils Absolute 03/13/2023 0 07    • Sodium 03/13/2023 140    • Potassium 03/13/2023 3 7 • Chloride 03/13/2023 103    • CO2 03/13/2023 30    • ANION GAP 03/13/2023 7    • BUN 03/13/2023 21    • Creatinine 03/13/2023 1 01    • Glucose 03/13/2023 92    • Calcium 03/13/2023 8 8    • AST 03/13/2023 24    • ALT 03/13/2023 50    • Alkaline Phosphatase 03/13/2023 64    • Total Protein 03/13/2023 7 3    • Albumin 03/13/2023 4 2    • Total Bilirubin 03/13/2023 0 35    • eGFR 03/13/2023 95    • Lipase 03/13/2023 29    • Color, UA 03/13/2023 Light Yellow    • Clarity, UA 03/13/2023 Clear    • Specific Gravity, UA 03/13/2023 1 020    • pH, UA 03/13/2023 6 0    • Leukocytes, UA 03/13/2023 Negative    • Nitrite, UA 03/13/2023 Negative    • Protein, UA 03/13/2023 Negative    • Glucose, UA 03/13/2023 Negative    • Ketones, UA 03/13/2023 Negative    • Urobilinogen, UA 03/13/2023 0 2    • Bilirubin, UA 03/13/2023 Negative    • Occult Blood, UA 03/13/2023 Negative      Imaging Studies: I have personally reviewed pertinent films in PACS

## 2023-03-14 NOTE — DISCHARGE INSTRUCTIONS
Take medications as directed only  Follow-up with your primary care physician and/or GI at the number provided      Return to the emergency department at anytime for any severe abdominal pain, bloody stools, intractable nausea/vomiting, fever

## 2023-03-14 NOTE — ED CARE HANDOFF
Emergency Department Sign Out Note        Sign out and transfer of care from Dr Shannon Amador  See Separate Emergency Department note  The patient, Maxine Grimes, was evaluated by the previous provider for abdominal pain  Workup Completed:    Labs  CT scan    ED Course / Workup Pending (followup): Case endorsed to me by Dr Shannon Amador at 11 PM pending CT scan and reevaluation  12:37 AM    CT scans reviewed with patient  Repeat examination is benign  Patient has stable vital signs and is nontoxic-appearing  He will be discharged home with supportive care instructions, a follow-up plan to see his primary care physician and/or GI as well as strict return precautions  Patient states that he understands and agrees with plan as discussed and all questions answered prior to discharge  Procedures  MDM        Disposition  Final diagnoses:   None     ED Disposition     None      Follow-up Information    None       Patient's Medications   Discharge Prescriptions    No medications on file     No discharge procedures on file         ED Provider  Electronically Signed by     John Hernandez MD  03/14/23 8812

## 2023-03-14 NOTE — PROGRESS NOTES
Consultation - [facility]) [facility]  Maxine Grimes 30 y o  male MRN: [id number]  Unit/Bed#:  Encounter: [id number]        Consults    ASSESSMENT/PLAN:     1  Left lower quadrant pain-likely secondary to epiploic appendagitis  -Continue supportive care  -Increase water intake to 64 ounces daily   -Use NSAIDs on as-needed basis, alternate with acetaminophen  - Discussed the risks of NSAIDs including gastritis/peptic ulcer disease  2   High risk colon cancer screening-has personal history of colon polyps  Reports having undergone colonoscopy 5 to 6 years ago at [facility] which was notable for colon polyps  I do not have these results  Patient also tells me that he has family history of colon cancer in maternal aunt at a young age     -We will schedule increased rescreening colonoscopy given personal history of polyps  I do not have the report however  -MiraLAX/Dulcolax prep instructions given  -Patient was explained about the risks and benefits of the procedure  Risks including but not limited to bleeding, infection, perforation were explained in detail  Also explained about less than 100% sensitivity with the exam and other alternatives  ______________________________________________________________________    Reason for Consult / Principal Problem: [unfilled]    HPI: Maxine Grimes is a 30y o  year old male presents after recent visit to the emergency room with abdominal pain  Patient reports that he had been experiencing left lower quadrant/suprapubic abdominal pain for few days and ultimately ended up going to the urgent care  He underwent CAT scan which was notable for a 1 5 cm focus of epiploic appendagitis in the left lower quadrant abutting the proximal sigmoid colon  Patient was subsequently discharged home with naproxen and recommended a GI follow-up    Patient reports that he took 1 dose of naproxen this morning and has had mild improvement only   He does tell me that he has had increasing constipation over the past 1 month, reports that he was previously having multiple bowel months on daily basis however over the past 1 month or so has been having only 1 bowel meant on daily basis  He reports that he sometimes also feels as though he is not able to passes gas  Denies any change in medications, no change in diet, no  Loss of appetite, early satiety  He does report family history of colon cancer in his maternal aunt  He reports that he underwent colonoscopy himself 6 years ago at [facility] and was noted to have polyps  He reports that he is overdue for colonoscopy at this time  I do not have these results  He denies any weight loss fortunately  He reports that he has in fact gained weight  Denies any upper GI symptoms including acid reflux, dysphagia, dyne aphasia, loss of appetite or early satiety  Blood work from yesterday in the emergency room is notable for normal CMP, normal CBC, UA was negative  CT of abdomen and pelvis with contrast was notable for 1 5 cm focus of epiploic appendagitis in the left lower quadrant abutting the proximal sigmoid colon  There is also evidence of hepatic steatosis  Review of Systems: The remainder of the review of systems was negative except for the pertinent positives noted in HPI       Historical Information   Past Medical History:   Diagnosis Date   • Allergic rhinitis    • Frequent headaches      Past Surgical History:   Procedure Laterality Date   • WISDOM TOOTH EXTRACTION       Social History   Social History     Substance and Sexual Activity   Alcohol Use Yes   • Alcohol/week: 7 0 standard drinks   • Types: 7 Cans of beer per week     Social History     Substance and Sexual Activity   Drug Use Never     Social History     Tobacco Use   Smoking Status Light Smoker   • Types: Cigars   • Passive exposure: Past   Smokeless Tobacco Never     No family history on file     Meds/Allergies     (Not in a hospital admission)    No current facility-administered medications for this visit  No Known Allergies    Objective     There were no vitals taken for this visit      [unfilled]    PHYSICAL EXAM     GEN: well nourished, well developed, no acute distress  HEENT: anicteric, MMM, no cervical or supraclavicular lymphadenopathy  CV: RRR, no m/r/g  CHEST: CTA b/l, no WRR  ABD: +BS, soft, NT/ND, no hepatosplenomegaly  EXT: no c/c/e  SKIN: no rashes,  NEURO: aaox3    Lab Results:   Admission on 03/13/2023, Discharged on 03/14/2023   Component Date Value   • WBC 03/13/2023 8 51    • RBC 03/13/2023 5 14    • Hemoglobin 03/13/2023 16 3    • Hematocrit 03/13/2023 46 5    • MCV 03/13/2023 91    • MCH 03/13/2023 31 7    • MCHC 03/13/2023 35 1    • RDW 03/13/2023 11 9    • MPV 03/13/2023 11 1    • Platelets 49/95/5982 211    • nRBC 03/13/2023 0    • Neutrophils Relative 03/13/2023 51    • Immat GRANS % 03/13/2023 0    • Lymphocytes Relative 03/13/2023 36    • Monocytes Relative 03/13/2023 10    • Eosinophils Relative 03/13/2023 2    • Basophils Relative 03/13/2023 1    • Neutrophils Absolute 03/13/2023 4 35    • Immature Grans Absolute 03/13/2023 0 02    • Lymphocytes Absolute 03/13/2023 3 07    • Monocytes Absolute 03/13/2023 0 83    • Eosinophils Absolute 03/13/2023 0 17    • Basophils Absolute 03/13/2023 0 07    • Sodium 03/13/2023 140    • Potassium 03/13/2023 3 7    • Chloride 03/13/2023 103    • CO2 03/13/2023 30    • ANION GAP 03/13/2023 7    • BUN 03/13/2023 21    • Creatinine 03/13/2023 1 01    • Glucose 03/13/2023 92    • Calcium 03/13/2023 8 8    • AST 03/13/2023 24    • ALT 03/13/2023 50    • Alkaline Phosphatase 03/13/2023 64    • Total Protein 03/13/2023 7 3    • Albumin 03/13/2023 4 2    • Total Bilirubin 03/13/2023 0 35    • eGFR 03/13/2023 95    • Lipase 03/13/2023 29    • Color, UA 03/13/2023 Light Yellow    • Clarity, UA 03/13/2023 Clear    • Specific [place], UA 03/13/2023 1 020    • pH, UA 03/13/2023 6 0    • Leukocytes, UA 03/13/2023 Negative    • Nitrite, UA 03/13/2023 Negative    • Protein, UA 03/13/2023 Negative    • Glucose, UA 03/13/2023 Negative    • Ketones, UA 03/13/2023 Negative    • Urobilinogen, UA 03/13/2023 0 2    • Bilirubin, UA 03/13/2023 Negative    • Occult Blood, UA 03/13/2023 Negative      Imaging Studies: I have personally reviewed pertinent films in PACS

## 2023-05-29 ENCOUNTER — OFFICE VISIT (OUTPATIENT)
Dept: URGENT CARE | Facility: CLINIC | Age: 38
End: 2023-05-29

## 2023-05-29 VITALS
WEIGHT: 163 LBS | RESPIRATION RATE: 20 BRPM | OXYGEN SATURATION: 99 % | HEART RATE: 70 BPM | SYSTOLIC BLOOD PRESSURE: 115 MMHG | HEIGHT: 70 IN | DIASTOLIC BLOOD PRESSURE: 69 MMHG | TEMPERATURE: 97.2 F | BODY MASS INDEX: 23.34 KG/M2

## 2023-05-29 DIAGNOSIS — J01.00 ACUTE NON-RECURRENT MAXILLARY SINUSITIS: Primary | ICD-10-CM

## 2023-05-29 RX ORDER — AMOXICILLIN AND CLAVULANATE POTASSIUM 875; 125 MG/1; MG/1
1 TABLET, FILM COATED ORAL EVERY 12 HOURS SCHEDULED
Qty: 14 TABLET | Refills: 0 | Status: SHIPPED | OUTPATIENT
Start: 2023-05-29 | End: 2023-06-05

## 2023-05-29 RX ORDER — BROMPHENIRAMINE MALEATE, PSEUDOEPHEDRINE HYDROCHLORIDE, AND DEXTROMETHORPHAN HYDROBROMIDE 2; 30; 10 MG/5ML; MG/5ML; MG/5ML
10 SYRUP ORAL 4 TIMES DAILY PRN
Qty: 118 ML | Refills: 0 | Status: SHIPPED | OUTPATIENT
Start: 2023-05-29

## 2023-05-29 NOTE — PROGRESS NOTES
[facility] [facility]        NAME: Damaris Carter is a 30 y o  male  : 1985    MRN: [id number]  DATE: May 29, 2023  TIME: 12:06 PM    Assessment and Plan   Acute non-recurrent maxillary sinusitis [J01 00]  1  Acute non-recurrent maxillary sinusitis  amoxicillin-clavulanate (AUGMENTIN) 875-125 mg per tablet    brompheniramine-pseudoephedrine-DM 30-2-10 MG/5ML syrup            Patient Instructions   Patient Instructions   Bromfed at night   Augmentin 2x a day for 7 days         Follow up with PCP in 3-5 days  Proceed to  ER if symptoms worsen  Chief Complaint     Chief Complaint   Patient presents with   • Sinusitis     Cold since , sinus pressure, cough and congestion, headache         History of Present Illness       The patient is a 56-year-old male presenting today for sinus pain and pressure  He reports having cold-like symptoms upon  now having the lingering sinus pressure, congestion, sl cough, sore throat and headache  Pain is worse when leaning forward  Review of Systems   Review of Systems   Constitutional: Negative for activity change, appetite change, chills, diaphoresis and fever  HENT: Positive for congestion, sinus pressure, sinus pain and sore throat  Negative for ear pain and rhinorrhea  Eyes: Negative for pain and visual disturbance  Respiratory: Positive for cough  Negative for chest tightness and shortness of breath  Cardiovascular: Negative for chest pain and palpitations  Gastrointestinal: Negative for abdominal pain, diarrhea, nausea and vomiting  Genitourinary: Negative for dysuria and hematuria  Musculoskeletal: Negative for arthralgias, back pain and myalgias  Skin: Negative for color change, pallor and rash  Neurological: Negative for seizures, syncope and headaches  All other systems reviewed and are negative          Current Medications       Current Outpatient Medications:   •  amoxicillin-clavulanate (AUGMENTIN) 875-125 mg per tablet, "Take 1 tablet by mouth every 12 (twelve) hours for 7 days, Disp: 14 tablet, Rfl: 0  •  brompheniramine-pseudoephedrine-DM 30-2-10 MG/5ML syrup, Take 10 mL by mouth 4 (four) times a day as needed for congestion or cough, Disp: 118 mL, Rfl: 0  •  cetirizine (ZyrTEC) 10 mg tablet, Take 10 mg by mouth daily as needed for allergies   (Patient not taking: Reported on 3/13/2023), Disp: , Rfl:   •  fluticasone (FLONASE) 50 mcg/act nasal spray, 1 spray into each nostril daily (Patient not taking: Reported on 3/13/2023), Disp: 18 2 mL, Rfl: 0  •  naproxen (Naprosyn) 500 mg tablet, Take 1 tablet (500 mg total) by mouth 2 (two) times a day with meals (Patient not taking: Reported on 5/29/2023), Disp: 30 tablet, Rfl: 0  •  pseudoephedrine (SUDAFED) 120 MG 12 hr tablet, Take 1 tablet (120 mg total) by mouth 2 (two) times a day (Patient not taking: Reported on 3/13/2023), Disp: 14 tablet, Rfl: 0    Current Allergies     Allergies as of 05/29/2023   • (No Known Allergies)            The following portions of the patient's history were reviewed and updated as appropriate: allergies, current medications, past family history, past medical history, past social history, past surgical history and problem list      Past Medical History:   Diagnosis Date   • Allergic rhinitis    • Frequent headaches        Past Surgical History:   Procedure Laterality Date   • COLONOSCOPY     • WISDOM TOOTH EXTRACTION         No family history on file  Medications have been verified  Objective   /69   Pulse 70   Temp (!) 97 2 °F (36 2 °C)   Resp 20   Ht 5' 10\" (1 778 m)   Wt 73 9 kg (163 lb)   SpO2 99%   BMI 23 39 kg/m²        Physical Exam     Physical Exam  Vitals and nursing note reviewed  Constitutional:       General: He is not in acute distress  Appearance: Normal appearance  He is normal weight  He is not ill-appearing, toxic-appearing or diaphoretic  HENT:      Head: Normocephalic and atraumatic        Right Ear: " Tympanic membrane, ear canal and external ear normal  There is no impacted cerumen  Left Ear: Tympanic membrane, ear canal and external ear normal  There is no impacted cerumen  Nose: Congestion present  No rhinorrhea  Mouth/Throat:      Mouth: Mucous membranes are moist       Pharynx: Oropharynx is clear  Posterior oropharyngeal erythema present  Eyes:      Extraocular Movements: Extraocular movements intact  Conjunctiva/sclera: Conjunctivae normal       Pupils: Pupils are equal, round, and reactive to light  Neck:      Vascular: No carotid bruit  Cardiovascular:      Rate and Rhythm: Normal rate and regular rhythm  Heart sounds: Normal heart sounds  No murmur heard  No friction rub  No gallop  Pulmonary:      Effort: Pulmonary effort is normal  No respiratory distress  Breath sounds: Normal breath sounds  No stridor  No wheezing, rhonchi or rales  Chest:      Chest wall: No tenderness  Abdominal:      General: Abdomen is flat  Bowel sounds are normal  There is no distension  Palpations: Abdomen is soft  Tenderness: There is no abdominal tenderness  There is no guarding  Musculoskeletal:      Cervical back: Normal range of motion  No rigidity or tenderness  Lymphadenopathy:      Cervical: No cervical adenopathy  Skin:     General: Skin is warm and dry  Capillary Refill: Capillary refill takes less than 2 seconds  Neurological:      Mental Status: He is alert

## 2023-07-06 ENCOUNTER — ANESTHESIA (OUTPATIENT)
Dept: GASTROENTEROLOGY | Facility: AMBULARY SURGERY CENTER | Age: 38
End: 2023-07-06

## 2023-07-06 ENCOUNTER — ANESTHESIA EVENT (OUTPATIENT)
Dept: GASTROENTEROLOGY | Facility: AMBULARY SURGERY CENTER | Age: 38
End: 2023-07-06

## 2023-07-06 ENCOUNTER — HOSPITAL ENCOUNTER (OUTPATIENT)
Dept: GASTROENTEROLOGY | Facility: AMBULARY SURGERY CENTER | Age: 38
Setting detail: OUTPATIENT SURGERY
End: 2023-07-06
Attending: INTERNAL MEDICINE
Payer: COMMERCIAL

## 2023-07-06 VITALS
OXYGEN SATURATION: 99 % | DIASTOLIC BLOOD PRESSURE: 75 MMHG | TEMPERATURE: 97.7 F | HEART RATE: 57 BPM | HEIGHT: 70 IN | SYSTOLIC BLOOD PRESSURE: 109 MMHG | BODY MASS INDEX: 23.34 KG/M2 | RESPIRATION RATE: 18 BRPM | WEIGHT: 163 LBS

## 2023-07-06 DIAGNOSIS — Z86.010 HISTORY OF COLON POLYPS: ICD-10-CM

## 2023-07-06 DIAGNOSIS — Z80.0 FAMILY HISTORY OF COLON CANCER: ICD-10-CM

## 2023-07-06 PROCEDURE — 45378 DIAGNOSTIC COLONOSCOPY: CPT | Performed by: INTERNAL MEDICINE

## 2023-07-06 RX ORDER — SODIUM CHLORIDE, SODIUM LACTATE, POTASSIUM CHLORIDE, CALCIUM CHLORIDE 600; 310; 30; 20 MG/100ML; MG/100ML; MG/100ML; MG/100ML
125 INJECTION, SOLUTION INTRAVENOUS CONTINUOUS
Status: DISCONTINUED | OUTPATIENT
Start: 2023-07-06 | End: 2023-07-10 | Stop reason: HOSPADM

## 2023-07-06 RX ORDER — LIDOCAINE HYDROCHLORIDE 10 MG/ML
INJECTION, SOLUTION EPIDURAL; INFILTRATION; INTRACAUDAL; PERINEURAL AS NEEDED
Status: DISCONTINUED | OUTPATIENT
Start: 2023-07-06 | End: 2023-07-06

## 2023-07-06 RX ORDER — GLYCOPYRROLATE 0.2 MG/ML
INJECTION INTRAMUSCULAR; INTRAVENOUS AS NEEDED
Status: DISCONTINUED | OUTPATIENT
Start: 2023-07-06 | End: 2023-07-06

## 2023-07-06 RX ORDER — PROPOFOL 10 MG/ML
INJECTION, EMULSION INTRAVENOUS AS NEEDED
Status: DISCONTINUED | OUTPATIENT
Start: 2023-07-06 | End: 2023-07-06

## 2023-07-06 RX ADMIN — GLYCOPYRROLATE 0.1 MCG: 0.2 INJECTION, SOLUTION INTRAMUSCULAR; INTRAVENOUS at 09:56

## 2023-07-06 RX ADMIN — PROPOFOL 150 MG: 10 INJECTION, EMULSION INTRAVENOUS at 09:38

## 2023-07-06 RX ADMIN — SODIUM CHLORIDE, SODIUM LACTATE, POTASSIUM CHLORIDE, AND CALCIUM CHLORIDE: .6; .31; .03; .02 INJECTION, SOLUTION INTRAVENOUS at 09:37

## 2023-07-06 RX ADMIN — PROPOFOL 50 MG: 10 INJECTION, EMULSION INTRAVENOUS at 09:46

## 2023-07-06 RX ADMIN — PROPOFOL 100 MG: 10 INJECTION, EMULSION INTRAVENOUS at 09:43

## 2023-07-06 RX ADMIN — GLYCOPYRROLATE 0.1 MCG: 0.2 INJECTION, SOLUTION INTRAMUSCULAR; INTRAVENOUS at 09:53

## 2023-07-06 RX ADMIN — LIDOCAINE HYDROCHLORIDE 50 MG: 10 INJECTION, SOLUTION EPIDURAL; INFILTRATION; INTRACAUDAL; PERINEURAL at 09:38

## 2023-07-06 NOTE — ANESTHESIA PREPROCEDURE EVALUATION
Procedure:  COLONOSCOPY    Relevant Problems   ANESTHESIA (within normal limits)      CARDIO (within normal limits)      ENDO (within normal limits)      PULMONARY (within normal limits)        Physical Exam    Airway    Mallampati score: II  TM Distance: >3 FB  Neck ROM: full     Dental   No notable dental hx     Cardiovascular      Pulmonary      Other Findings        Anesthesia Plan  ASA Score- 1     Anesthesia Type- IV sedation with anesthesia with ASA Monitors. Additional Monitors:   Airway Plan:           Plan Factors-Exercise tolerance (METS): >4 METS. Chart reviewed. Patient summary reviewed. Patient is not a current smoker. Induction-     Postoperative Plan-     Informed Consent- Anesthetic plan and risks discussed with patient. I personally reviewed this patient with the CRNA. Discussed and agreed on the Anesthesia Plan with the CRNA. Igor Deras

## 2023-07-06 NOTE — H&P
History and Physical -  [facility]  Emelia Reza 30 y.o. male MRN: [id number]    HPI: Emelia Reza is a 30y.o. year old male who presents for high rescreening colonoscopy. Review of Systems    Historical Information   Past Medical History:   Diagnosis Date   • Allergic rhinitis    • Frequent headaches      Past Surgical History:   Procedure Laterality Date   • COLONOSCOPY     • WISDOM TOOTH EXTRACTION       Social History   Social History     Substance and Sexual Activity   Alcohol Use Yes   • Alcohol/week: 7.0 standard drinks of alcohol   • Types: 7 Cans of beer per week     Social History     Substance and Sexual Activity   Drug Use Never     Social History     Tobacco Use   Smoking Status Light Smoker   • Types: Cigars   • Passive exposure: Past   Smokeless Tobacco Never     History reviewed. No pertinent family history. Meds/Allergies     (Not in a hospital admission)      No Known Allergies    Objective     /77   Pulse 55   Temp 97.7 °F (36.5 °C) (Temporal)   Resp 18   Ht 5' 10" (1.778 m)   Wt 73.9 kg (163 lb)   SpO2 100%   BMI 23.39 kg/m²       PHYSICAL EXAM    Gen: NAD  CV: RRR  CHEST: Clear  ABD: soft, NT/ND  EXT: no edema  Neuro: AAO      ASSESSMENT/PLAN:  This is a 30y.o. year old male here for high rescreening colonoscopy. Patient was explained about the risks and benefits of the procedure. Risks including but not limited to bleeding, infection, perforation were explained in detail. PLAN:   Procedure: Colonoscopy.

## 2023-07-06 NOTE — ANESTHESIA POSTPROCEDURE EVALUATION
Post-Op Assessment Note    CV Status:  Stable  Pain Score: 0    Pain management: adequate     Mental Status:  Awake and alert   Hydration Status:  Euvolemic and stable   PONV Controlled:  Controlled   Airway Patency:  Patent      Post Op Vitals Reviewed: Yes      Staff: CRNA         No notable events documented.     /67 (07/06/23 1002)    Temp      Pulse 55 (07/06/23 1002)   Resp 18 (07/06/23 1002)    SpO2 100 % (07/06/23 1002)

## 2024-01-04 ENCOUNTER — OFFICE VISIT (OUTPATIENT)
Dept: URGENT CARE | Facility: CLINIC | Age: 39
End: 2024-01-04
Payer: COMMERCIAL

## 2024-01-04 VITALS
BODY MASS INDEX: 24.39 KG/M2 | TEMPERATURE: 97.6 F | OXYGEN SATURATION: 96 % | HEART RATE: 82 BPM | DIASTOLIC BLOOD PRESSURE: 72 MMHG | SYSTOLIC BLOOD PRESSURE: 98 MMHG | RESPIRATION RATE: 16 BRPM | WEIGHT: 170 LBS

## 2024-01-04 DIAGNOSIS — B34.9 VIRAL SYNDROME: Primary | ICD-10-CM

## 2024-01-04 PROCEDURE — 99213 OFFICE O/P EST LOW 20 MIN: CPT | Performed by: PHYSICIAN ASSISTANT

## 2024-01-04 PROCEDURE — 87636 SARSCOV2 & INF A&B AMP PRB: CPT | Performed by: PHYSICIAN ASSISTANT

## 2024-01-04 NOTE — PROGRESS NOTES
[facility]        NAME: Shaggy Rodriguez is a 38 y.o. male  : 1985    MRN: [id number]  DATE: 2024  TIME: 10:16 AM    Assessment and Plan   Viral syndrome [B34.9]  1. Viral syndrome  Covid19 and INFLUENZA A/B PCR        Continue supportive care and staying hydrated. Appears well on exam. Discussed strict return to care precautions as well as red flag symptoms which should prompt immediate ED referral. Pt verbalized understanding and is in agreement with plan.  Please follow up with your primary care provider within the next week. Please remember that your visit today was with an urgent care provider and should not replace follow up with your primary care provider for chronic medical issues or annual physicals.       Patient Instructions       Follow up with PCP in 3-5 days.  Proceed to  ER if symptoms worsen.    Chief Complaint     Chief Complaint   Patient presents with    Cold Like Symptoms     Pt presents with fever (101F) , body aches, headache, diarrhea, productive cough; 24// needs a work note         History of Present Illness       Shaggy Rodriguez is a(n) 38 y.o. male presenting with URI symptoms x 2 days  Past medical history: seasonal allergies  Congestion: no  Sore throat: no  Cough: yes  Sputum production: yes, occasionally  Fever: yes, tmax 101F  Body aches: yes  Loss of smell/taste: no  GI symptoms: yes diarrhea  Known sick contacts: yes, son was sick with similar  OTC meds tried: advil  + headache        Review of Systems   Review of Systems   Constitutional:  Positive for diaphoresis and fever. Negative for chills and fatigue.   HENT:  Negative for congestion, ear pain, postnasal drip, rhinorrhea, sinus pain, sneezing, sore throat and trouble swallowing.    Eyes:  Negative for pain and redness.   Respiratory:  Positive for cough. Negative for chest tightness, shortness of breath and wheezing.    Cardiovascular:  Negative for chest pain and leg swelling.    Gastrointestinal:  Positive for diarrhea. Negative for nausea and vomiting.   Musculoskeletal:  Positive for myalgias.   Neurological:  Positive for headaches. Negative for dizziness and weakness.         Current Medications       Current Outpatient Medications:     cetirizine (ZyrTEC) 10 mg tablet, Take 10 mg by mouth daily as needed for allergies, Disp: , Rfl:     brompheniramine-pseudoephedrine-DM 30-2-10 MG/5ML syrup, Take 10 mL by mouth 4 (four) times a day as needed for congestion or cough (Patient not taking: Reported on 1/4/2024), Disp: 118 mL, Rfl: 0    fluticasone (FLONASE) 50 mcg/act nasal spray, 1 spray into each nostril daily (Patient not taking: Reported on 3/13/2023), Disp: 18.2 mL, Rfl: 0    naproxen (Naprosyn) 500 mg tablet, Take 1 tablet (500 mg total) by mouth 2 (two) times a day with meals (Patient not taking: Reported on 5/29/2023), Disp: 30 tablet, Rfl: 0    pseudoephedrine (SUDAFED) 120 MG 12 hr tablet, Take 1 tablet (120 mg total) by mouth 2 (two) times a day (Patient not taking: Reported on 3/13/2023), Disp: 14 tablet, Rfl: 0    Current Allergies     Allergies as of 01/04/2024    (No Known Allergies)            The following portions of the patient's history were reviewed and updated as appropriate: allergies, current medications, past family history, past medical history, past social history, past surgical history and problem list.     Past Medical History:   Diagnosis Date    Allergic rhinitis     Frequent headaches        Past Surgical History:   Procedure Laterality Date    COLONOSCOPY      WISDOM TOOTH EXTRACTION         History reviewed. No pertinent family history.      Medications have been verified.        Objective   BP 98/72   Pulse 82   Temp 97.6 °F (36.4 °C)   Resp 16   Wt 77.1 kg (170 lb)   SpO2 96%   BMI 24.39 kg/m²        Physical Exam     Physical Exam  Vitals and nursing note reviewed.   Constitutional:       General: He is not in acute distress.     Appearance:  Normal appearance. He is not toxic-appearing.   HENT:      Head: Normocephalic and atraumatic.      Right Ear: Tympanic membrane, ear canal and external ear normal.      Left Ear: Tympanic membrane, ear canal and external ear normal.      Nose: No congestion.      Mouth/Throat:      Mouth: Mucous membranes are moist.      Pharynx: Oropharynx is clear. No oropharyngeal exudate or posterior oropharyngeal erythema.   Eyes:      Conjunctiva/sclera: Conjunctivae normal.      Pupils: Pupils are equal, round, and reactive to light.   Neck:      Meningeal: Brudzinski's sign and Kernig's sign absent.   Cardiovascular:      Rate and Rhythm: Normal rate and regular rhythm.      Heart sounds: Normal heart sounds.   Pulmonary:      Effort: Pulmonary effort is normal. No respiratory distress.      Breath sounds: Normal breath sounds. No wheezing, rhonchi or rales.   Musculoskeletal:      Cervical back: Normal range of motion and neck supple. No pain with movement or muscular tenderness. Normal range of motion.   Skin:     General: Skin is warm and dry.      Capillary Refill: Capillary refill takes less than 2 seconds.      Findings: No rash.   Neurological:      Mental Status: He is alert and oriented to person, place, and time.   Psychiatric:         Behavior: Behavior normal.

## 2024-01-04 NOTE — LETTER
[facility]  [address] [facility] [address]  [phone]  Dept: [phone]    January 4, 2024    Patient: Shaggy Rodriguez  YOB: 1985    Shaggy Rodriguez was seen and evaluated at our [facility]. Please note if Covid and Flu tests are negative, they may return to work when fever free for 24 hours without the use of a fever reducing agent. If Covid or Flu test is positive, they may return to work on 1/8/2024, as this is 5 days from the onset of symptoms. Upon return, they must then adhere to strict masking for an additional 5 days.    Sincerely,    Ivett Cook PA-C

## 2024-01-05 LAB
FLUAV RNA RESP QL NAA+PROBE: NEGATIVE
FLUBV RNA RESP QL NAA+PROBE: NEGATIVE
SARS-COV-2 RNA RESP QL NAA+PROBE: NEGATIVE

## 2024-02-18 ENCOUNTER — OFFICE VISIT (OUTPATIENT)
Dept: URGENT CARE | Facility: CLINIC | Age: 39
End: 2024-02-18
Payer: COMMERCIAL

## 2024-02-18 VITALS — HEART RATE: 62 BPM | SYSTOLIC BLOOD PRESSURE: 125 MMHG | DIASTOLIC BLOOD PRESSURE: 75 MMHG | RESPIRATION RATE: 16 BRPM

## 2024-02-18 DIAGNOSIS — S16.1XXA STRAIN OF MUSCLE, FASCIA AND TENDON AT NECK LEVEL, INITIAL ENCOUNTER: Primary | ICD-10-CM

## 2024-02-18 PROCEDURE — 99213 OFFICE O/P EST LOW 20 MIN: CPT | Performed by: NURSE PRACTITIONER

## 2024-02-18 RX ORDER — BACLOFEN 20 MG/1
20 TABLET ORAL 3 TIMES DAILY
Qty: 15 TABLET | Refills: 0 | Status: SHIPPED | OUTPATIENT
Start: 2024-02-18

## 2024-02-18 NOTE — PROGRESS NOTES
[facility]        NAME: Shaggy Rodriguez is a 38 y.o. male  : 1985    MRN: [id number]  DATE: 2024  TIME: 11:03 AM    Assessment and Plan   Strain of muscle, fascia and tendon at neck level, initial encounter [S16.1XXA]  1. Strain of muscle, fascia and tendon at neck level, initial encounter  baclofen 20 mg tablet        Patient took naproxen at 730 this morning.  Unable to medicate with Toradol in the office due to recent NSAID use.  Advised to continue naproxen every 12 hours.  Baclofen sent to pharmacy.  Advised to use heat and gentle stretches.  Follow-up with primary care provider or chiropractor if no improvement.    Patient Instructions       Follow up with PCP in 3-5 days.  Proceed to  ER if symptoms worsen.    Chief Complaint     Chief Complaint   Patient presents with    Neck Pain     Neck pain on L and radiates down a little. No known injury. Woke up yesterday with pain. Taking Naproxen         History of Present Illness       Patient is a 38-year-old male presenting with left neck/upper back pain.  He states that he woke up with the pain yesterday morning.  This morning when he woke up the pain was worse.  He states that he did recently sleep on his stomach which is not typical for him.  Denies any injury.  Increased pain with movement.  He treats with a chiropractor every 2 weeks.  He has been treating with naproxen and ice.     Neck Pain   Pertinent negatives include no fever or headaches.       Review of Systems   Review of Systems   Constitutional:  Negative for activity change, chills and fever.   Respiratory:  Negative for cough and shortness of breath.    Musculoskeletal:  Positive for neck pain and neck stiffness.   Skin:  Negative for color change and wound.   Neurological:  Negative for headaches.         Current Medications       Current Outpatient Medications:     baclofen 20 mg tablet, Take 1 tablet (20 mg total) by mouth 3 (three) times a day, Disp: 15  tablet, Rfl: 0    cetirizine (ZyrTEC) 10 mg tablet, Take 10 mg by mouth daily as needed for allergies, Disp: , Rfl:     naproxen (Naprosyn) 500 mg tablet, Take 1 tablet (500 mg total) by mouth 2 (two) times a day with meals, Disp: 30 tablet, Rfl: 0    brompheniramine-pseudoephedrine-DM 30-2-10 MG/5ML syrup, Take 10 mL by mouth 4 (four) times a day as needed for congestion or cough (Patient not taking: Reported on 1/4/2024), Disp: 118 mL, Rfl: 0    fluticasone (FLONASE) 50 mcg/act nasal spray, 1 spray into each nostril daily (Patient not taking: Reported on 3/13/2023), Disp: 18.2 mL, Rfl: 0    pseudoephedrine (SUDAFED) 120 MG 12 hr tablet, Take 1 tablet (120 mg total) by mouth 2 (two) times a day (Patient not taking: Reported on 3/13/2023), Disp: 14 tablet, Rfl: 0    Current Allergies     Allergies as of 02/18/2024    (No Known Allergies)            The following portions of the patient's history were reviewed and updated as appropriate: allergies, current medications, past family history, past medical history, past social history, past surgical history and problem list.     Past Medical History:   Diagnosis Date    Allergic rhinitis     Frequent headaches        Past Surgical History:   Procedure Laterality Date    COLONOSCOPY      WISDOM TOOTH EXTRACTION         No family history on file.      Medications have been verified.        Objective   /75   Pulse 62   Resp 16        Physical Exam     Physical Exam  Vitals reviewed.   Constitutional:       General: He is awake. He is not in acute distress.     Appearance: Normal appearance. He is normal weight.   HENT:      Head: Normocephalic.   Neck:     Cardiovascular:      Rate and Rhythm: Normal rate.   Pulmonary:      Effort: Pulmonary effort is normal.   Musculoskeletal:      Cervical back: Pain with movement and muscular tenderness present. No spinous process tenderness.   Skin:     General: Skin is warm and moist.   Neurological:      General: No focal  deficit present.      Mental Status: He is alert and oriented to person, place, and time.   Psychiatric:         Behavior: Behavior is cooperative.

## 2024-05-15 ENCOUNTER — OFFICE VISIT (OUTPATIENT)
Dept: URGENT CARE | Facility: CLINIC | Age: 39
End: 2024-05-15
Payer: COMMERCIAL

## 2024-05-15 VITALS
SYSTOLIC BLOOD PRESSURE: 110 MMHG | HEART RATE: 61 BPM | RESPIRATION RATE: 16 BRPM | DIASTOLIC BLOOD PRESSURE: 77 MMHG | OXYGEN SATURATION: 99 % | TEMPERATURE: 96.8 F | BODY MASS INDEX: 24.31 KG/M2 | WEIGHT: 169.4 LBS

## 2024-05-15 DIAGNOSIS — M10.9 ACUTE GOUT INVOLVING TOE OF RIGHT FOOT, UNSPECIFIED CAUSE: Primary | ICD-10-CM

## 2024-05-15 PROCEDURE — 99213 OFFICE O/P EST LOW 20 MIN: CPT | Performed by: PHYSICIAN ASSISTANT

## 2024-05-15 RX ORDER — COLCHICINE 0.6 MG/1
TABLET ORAL
Qty: 3 TABLET | Refills: 0 | Status: SHIPPED | OUTPATIENT
Start: 2024-05-15

## 2024-05-15 NOTE — PROGRESS NOTES
"  [facility]        NAME: Shaggy Rodriguez is a 38 y.o. male  : 1985    MRN: [id number]  DATE: May 15, 2024  TIME: 9:31 AM    Assessment and Plan   Acute gout involving toe of right foot, unspecified cause [M10.9]  1. Acute gout involving toe of right foot, unspecified cause  colchicine (COLCRYS) 0.6 mg tablet        Discussed pathophysiology of gout and supportive care. Discussed strict return to care precautions as well as red flag symptoms which should prompt immediate ED referral. Pt verbalized understanding and is in agreement with plan.  Please follow up with your primary care provider within the next week. Please remember that your visit today was with an urgent care provider and should not replace follow up with your primary care provider for chronic medical issues or annual physicals.       Patient Instructions       Follow up with PCP in 3-5 days.  Proceed to  ER if symptoms worsen.    If tests are performed, our office will contact you with results only if changes need to made to the care plan discussed with you at the visit. [facility]    Chief Complaint     Chief Complaint   Patient presents with    Toe Pain     Patient reports right great toe pain that started yesterday evening. Describes pain as \"throbbing.\" No BHARGAVI. Difficulty weight-bearing due to pain.          History of Present Illness       Patient is a 38-year-old male with no pertinent PMH presenting with atraumatic right great toe pain x 1 day.  States he had a similar episode about a year and a half ago which resolved on its own.  No OTC meds tried.  Denies numbness or tingling.  States it was red and swollen this morning but looks a little better now.  Denies excessive alcohol intake (has a few drinks on the weekends only) or foods high in purines, but states he thinks his dad suffered from gout.        Review of Systems   Review of Systems   Constitutional:  Negative for " chills and fever.   Respiratory:  Negative for shortness of breath.    Cardiovascular:  Negative for chest pain.   Gastrointestinal:  Negative for nausea and vomiting.   Musculoskeletal:  Positive for arthralgias. Negative for back pain, gait problem, joint swelling, myalgias and neck pain.   Skin:  Negative for color change and wound.   Neurological:  Negative for weakness and numbness.         Current Medications       Current Outpatient Medications:     cetirizine (ZyrTEC) 10 mg tablet, Take 10 mg by mouth daily as needed for allergies, Disp: , Rfl:     colchicine (COLCRYS) 0.6 mg tablet, Take 2 pills, then one hour later take remaining 1 pill, Disp: 3 tablet, Rfl: 0    baclofen 20 mg tablet, Take 1 tablet (20 mg total) by mouth 3 (three) times a day (Patient not taking: Reported on 5/15/2024), Disp: 15 tablet, Rfl: 0    brompheniramine-pseudoephedrine-DM 30-2-10 MG/5ML syrup, Take 10 mL by mouth 4 (four) times a day as needed for congestion or cough (Patient not taking: Reported on 5/15/2024), Disp: 118 mL, Rfl: 0    fluticasone (FLONASE) 50 mcg/act nasal spray, 1 spray into each nostril daily (Patient not taking: Reported on 3/13/2023), Disp: 18.2 mL, Rfl: 0    naproxen (Naprosyn) 500 mg tablet, Take 1 tablet (500 mg total) by mouth 2 (two) times a day with meals (Patient not taking: Reported on 5/15/2024), Disp: 30 tablet, Rfl: 0    pseudoephedrine (SUDAFED) 120 MG 12 hr tablet, Take 1 tablet (120 mg total) by mouth 2 (two) times a day (Patient not taking: Reported on 3/13/2023), Disp: 14 tablet, Rfl: 0    Current Allergies     Allergies as of 05/15/2024 - Reviewed 05/15/2024   Allergen Reaction Noted    Baclofen Other (See Comments) 05/15/2024            The following portions of the patient's history were reviewed and updated as appropriate: allergies, current medications, past family history, past medical history, past social history, past surgical history and problem list.     Past Medical History:    Diagnosis Date    Allergic     Allergic rhinitis     Frequent headaches        Past Surgical History:   Procedure Laterality Date    COLONOSCOPY      WISDOM TOOTH EXTRACTION         No family history on file.      Medications have been verified.        Objective   /77   Pulse 61   Temp (!) 96.8 °F (36 °C) (Temporal)   Resp 16   Wt 76.8 kg (169 lb 6.4 oz)   SpO2 99%   BMI 24.31 kg/m²        Physical Exam     Physical Exam  Vitals and nursing note reviewed.   Constitutional:       General: He is not in acute distress.     Appearance: Normal appearance. He is not ill-appearing.   HENT:      Head: Normocephalic and atraumatic.   Cardiovascular:      Rate and Rhythm: Normal rate.   Pulmonary:      Effort: Pulmonary effort is normal. No respiratory distress.   Musculoskeletal:         General: Swelling (mild swelling R 1st MTP) and tenderness present.      Comments: 2+ pulses, cap refill <2 sec. Full ROM   Skin:     General: Skin is warm and dry.      Capillary Refill: Capillary refill takes less than 2 seconds.      Findings: No erythema.   Neurological:      Mental Status: He is alert and oriented to person, place, and time.   Psychiatric:         Behavior: Behavior normal.

## 2024-05-20 ENCOUNTER — HOSPITAL ENCOUNTER (EMERGENCY)
Facility: HOSPITAL | Age: 39
Discharge: HOME | End: 2024-05-20
Attending: EMERGENCY MEDICINE
Payer: COMMERCIAL

## 2024-05-20 ENCOUNTER — APPOINTMENT (EMERGENCY)
Dept: RADIOLOGY | Facility: HOSPITAL | Age: 39
End: 2024-05-20
Payer: COMMERCIAL

## 2024-05-20 VITALS
HEART RATE: 88 BPM | RESPIRATION RATE: 18 BRPM | TEMPERATURE: 97.4 F | OXYGEN SATURATION: 99 % | DIASTOLIC BLOOD PRESSURE: 79 MMHG | SYSTOLIC BLOOD PRESSURE: 128 MMHG

## 2024-05-20 DIAGNOSIS — M79.674 GREAT TOE PAIN, RIGHT: Primary | ICD-10-CM

## 2024-05-20 DIAGNOSIS — A69.20 LYME DISEASE: ICD-10-CM

## 2024-05-20 LAB — ERYTHROCYTE [SEDIMENTATION RATE] IN BLOOD: 13 MM/HOUR (ref 0–14)

## 2024-05-20 PROCEDURE — 36415 COLL VENOUS BLD VENIPUNCTURE: CPT

## 2024-05-20 PROCEDURE — 85652 RBC SED RATE AUTOMATED: CPT

## 2024-05-20 PROCEDURE — 96374 THER/PROPH/DIAG INJ IV PUSH: CPT

## 2024-05-20 PROCEDURE — 73660 X-RAY EXAM OF TOE(S): CPT

## 2024-05-20 PROCEDURE — 99283 EMERGENCY DEPT VISIT LOW MDM: CPT

## 2024-05-20 PROCEDURE — 86618 LYME DISEASE ANTIBODY: CPT

## 2024-05-20 PROCEDURE — 86617 LYME DISEASE ANTIBODY: CPT

## 2024-05-20 PROCEDURE — 99284 EMERGENCY DEPT VISIT MOD MDM: CPT | Performed by: EMERGENCY MEDICINE

## 2024-05-20 RX ORDER — SUCRALFATE 1 G/1
1 TABLET ORAL DAILY
Qty: 10 TABLET | Refills: 0 | Status: SHIPPED | OUTPATIENT
Start: 2024-05-20 | End: 2024-05-30

## 2024-05-20 RX ORDER — CEPHALEXIN 500 MG/1
500 CAPSULE ORAL EVERY 6 HOURS SCHEDULED
Qty: 20 CAPSULE | Refills: 0 | Status: SHIPPED | OUTPATIENT
Start: 2024-05-20 | End: 2024-05-25

## 2024-05-20 RX ORDER — METHYLPREDNISOLONE 4 MG/1
TABLET ORAL
Qty: 21 TABLET | Refills: 0 | Status: SHIPPED | OUTPATIENT
Start: 2024-05-20

## 2024-05-20 RX ORDER — KETOROLAC TROMETHAMINE 30 MG/ML
15 INJECTION, SOLUTION INTRAMUSCULAR; INTRAVENOUS ONCE
Status: COMPLETED | OUTPATIENT
Start: 2024-05-20 | End: 2024-05-20

## 2024-05-20 RX ORDER — ACETAMINOPHEN 325 MG/1
975 TABLET ORAL ONCE
Status: COMPLETED | OUTPATIENT
Start: 2024-05-20 | End: 2024-05-20

## 2024-05-20 RX ORDER — NAPROXEN 500 MG/1
500 TABLET ORAL 2 TIMES DAILY WITH MEALS
Qty: 30 TABLET | Refills: 0 | Status: SHIPPED | OUTPATIENT
Start: 2024-05-20

## 2024-05-20 RX ADMIN — ACETAMINOPHEN 975 MG: 325 TABLET, FILM COATED ORAL at 08:47

## 2024-05-20 RX ADMIN — KETOROLAC TROMETHAMINE 15 MG: 30 INJECTION, SOLUTION INTRAMUSCULAR; INTRAVENOUS at 08:53

## 2024-05-20 NOTE — ED ATTENDING ATTESTATION
5/20/2024  I, Constantin Corea MD, saw and evaluated the patient. I have discussed the patient with the resident/non-physician practitioner and agree with the resident's/non-physician practitioner's findings, Plan of Care, and MDM as documented in the resident's/non-physician practitioner's note, except where noted. All available labs and Radiology studies were reviewed.  I was present for key portions of any procedure(s) performed by the resident/non-physician practitioner and I was immediately available to provide assistance.       At this point I agree with the current assessment done in the Emergency Department.  I have conducted an independent evaluation of this patient a history and physical is as follows:    History    Patient is a 38-year-old male, with a history significant for cigar use per my review of the medical record, who presents to the ED today for evaluation of a 6-day history of atraumatic, constant, progressively worsening right great toe pain.  There is associated swelling/warmth/erythema of this location.  Patient states he is outside frequently and had a recent tick bite.  Patient also reports history of similar joint problems but is uncertain if this was the same joint.  Per my review of the medical record, patient was recently evaluated by at an urgent care and prescribed colchicine but this did not remit his symptoms and have continued to worsen.  Patient denies fever, chills, night sweats, history of IVDU, weakness, numbness, chest pain, dyspnea, abdominal pain.  Patient has taken Tylenol x 2 daily, none today, as well as ibuprofen to remit his symptoms.  Patient play sports.    Patient is without other concerns at this time.     ROS  Patient denies: Fever; dysphagia; vision change; chest pain; dyspnea; abdominal pain; polyuria; dysuria; weakness; numbness; difficulty walking; confusion    Physical Exam    GENERAL APPEARANCE: NAD  NEURO: Patient is speaking clearly in complete  sentences.  Patient is answering appropriately and able follow commands.  Patient is moving all four extremities spontaneously.  No facial droop.  Tongue midline.  HEENT: PERRL, Moist mucous membranes, external ears normal, nose normal  Neck: No cervical adenopathy  CV: RRR. No murmurs, rubs, gallops  LUNGS: Clear to auscultation: No wheezes, stridor, rhonchi, rales  GI: Abdomen non-distended. Soft. Non-tender and without rebound or guarding   : Deferred at this time  MSK:  Erythema/swelling/warmth over the right great toe.  Significant discomfort with passive range of motion of the distal phalangeal joint of this toe.  Skin: Warm and dry.   Capillary refill: <2 seconds    Patient is currently afebrile and hemodynamically stable    Assessment/Plan/MDM  Toe pain/swelling  -Concern for gout.  I also considered septic arthritis, Lyme disease, fracture  -Will investigate with Lyme testing, x-ray  -I had extensive conversation with patient regarding risks, alternatives, and benefits of/to arthrocentesis, including risk of undiagnosed septic arthritis causing joint erosion/disability, and, due to previous negative experience with arthrocentesis, patient declined this at this time.  -Will discuss with podiatry.  Will manage with su taping, NSAIDs, Tylenol, hard soled shoe, follow-up, further based upon recommendations/workup    ED Course         Critical Care Time  Procedures

## 2024-05-20 NOTE — ED PROVIDER NOTES
History  Chief Complaint   Patient presents with    Toe Swelling     R foot great toe swelling since last Tuesday. Denies injury/bite. Seen at urgent care and given medication for gout, no relief      The patient is a 38 year old male who presents to ED for evaluation of right first toe pain. Pt states pain and redness of the right great toe started 6 days ago. He was seen by urgent care 5 days ago where he was given one dose of colchicine without much relief of symptoms. Pain is increased with walking and movement. He does not recall any injury to the toe or foot and has had no prior surgical procedures to the area. He states he had a similar episode last fall that resolved spontaneously without treatment and was not medically evaluated. He is unsure if it was the same toe or the opposite foot. He has taken tylenol and ibuprofen with only minor reduction of pain. Denies fever, chills, injury,         Prior to Admission Medications   Prescriptions Last Dose Informant Patient Reported? Taking?   baclofen 20 mg tablet   No No   Sig: Take 1 tablet (20 mg total) by mouth 3 (three) times a day   Patient not taking: Reported on 5/15/2024   brompheniramine-pseudoephedrine-DM 30-2-10 MG/5ML syrup  Self No No   Sig: Take 10 mL by mouth 4 (four) times a day as needed for congestion or cough   Patient not taking: Reported on 5/15/2024   cetirizine (ZyrTEC) 10 mg tablet  Self Yes No   Sig: Take 10 mg by mouth daily as needed for allergies   colchicine (COLCRYS) 0.6 mg tablet   No No   Sig: Take 2 pills, then one hour later take remaining 1 pill   fluticasone (FLONASE) 50 mcg/act nasal spray  Self No No   Si spray into each nostril daily   Patient not taking: Reported on 3/13/2023   naproxen (Naprosyn) 500 mg tablet  Self No No   Sig: Take 1 tablet (500 mg total) by mouth 2 (two) times a day with meals   Patient not taking: Reported on 5/15/2024   pseudoephedrine (SUDAFED) 120 MG 12 hr tablet  Self No No   Sig: Take 1 tablet  (120 mg total) by mouth 2 (two) times a day   Patient not taking: Reported on 3/13/2023      Facility-Administered Medications: None       Past Medical History:   Diagnosis Date    Allergic     Allergic rhinitis     Frequent headaches        Past Surgical History:   Procedure Laterality Date    COLONOSCOPY      WISDOM TOOTH EXTRACTION         History reviewed. No pertinent family history.  I have reviewed and agree with the history as documented.    E-Cigarette/Vaping    E-Cigarette Use Never User      E-Cigarette/Vaping Substances     Social History     Tobacco Use    Smoking status: Light Smoker     Types: Cigars     Passive exposure: Past    Smokeless tobacco: Never   Vaping Use    Vaping status: Never Used   Substance Use Topics    Alcohol use: Yes     Alcohol/week: 7.0 standard drinks of alcohol     Types: 7 Cans of beer per week    Drug use: Never        Review of Systems   Musculoskeletal:  Positive for gait problem and joint swelling. Negative for back pain and neck pain.   Skin:  Positive for color change. Negative for rash and wound.   Neurological:  Negative for weakness and numbness.   All other systems reviewed and are negative.      Physical Exam  ED Triage Vitals [05/20/24 0733]   Temperature Pulse Respirations Blood Pressure SpO2   (!) 97.4 °F (36.3 °C) 88 18 128/79 99 %      Temp src Heart Rate Source Patient Position - Orthostatic VS BP Location FiO2 (%)   -- Monitor -- -- --      Pain Score       5             Orthostatic Vital Signs  Vitals:    05/20/24 0733   BP: 128/79   Pulse: 88       Physical Exam  Vitals and nursing note reviewed.   Constitutional:       General: He is not in acute distress.     Appearance: Normal appearance. He is normal weight. He is not ill-appearing.   HENT:      Head: Normocephalic and atraumatic.      Nose: Nose normal.   Cardiovascular:      Rate and Rhythm: Normal rate and regular rhythm.      Pulses: Normal pulses.      Heart sounds: Normal heart sounds.    Pulmonary:      Effort: Pulmonary effort is normal. No respiratory distress.      Breath sounds: Normal breath sounds. No stridor. No wheezing, rhonchi or rales.   Abdominal:      General: Abdomen is flat.      Tenderness: There is no abdominal tenderness.   Musculoskeletal:         General: Tenderness present. No deformity. Normal range of motion.      Right lower leg: No edema.      Left lower leg: No edema.      Comments: Pt able to wiggle toes.    Skin:     General: Skin is dry.      Capillary Refill: Capillary refill takes less than 2 seconds.      Findings: Erythema present.      Comments: Erythema to the right lateral great toe over the area of the PIP joint. No palpable fluctuance. Cuticle and nail bed spared. Normal nail.   Neurological:      General: No focal deficit present.      Mental Status: He is alert and oriented to person, place, and time.   Psychiatric:         Mood and Affect: Mood normal.         Behavior: Behavior normal.         Thought Content: Thought content normal.         Judgment: Judgment normal.         ED Medications  Medications   ketorolac (TORADOL) injection 15 mg (15 mg Intravenous Given 5/20/24 0853)   acetaminophen (TYLENOL) tablet 975 mg (975 mg Oral Given 5/20/24 0847)       Diagnostic Studies  Results Reviewed       Procedure Component Value Units Date/Time    Sedimentation rate, automated [[id number]] Collected: 05/20/24 0853    Lab Status: In process Specimen: Blood from Arm, Left Updated: 05/20/24 0857    Lyme Total AB W Reflex to IGM/IGG [[id number]] Collected: 05/20/24 0853    Lab Status: In process Specimen: Blood from Arm, Left Updated: 05/20/24 0857    Narrative:      The following orders were created for panel order Lyme Total AB W Reflex to IGM/IGG.  Procedure                               Abnormality         Status                     ---------                               -----------         ------                     Lyme Total AB W Reflex t...[[id number]]                       In process                   Please view results for these tests on the individual orders.    Lyme Total AB W Reflex to IGM/IGG [[id number]] Collected: 05/20/24 0853    Lab Status: In process Specimen: Blood from Arm, Left Updated: 05/20/24 0857                   XR toe great min 2 view RIGHT   ED Interpretation by Constantin Corea MD (05/20 0839)   Per my independent interpretation: Distal phalange bony erosion            Procedures  Procedures      ED Course  ED Course as of 05/20/24 0902   Mon May 20, 2024   0844 Discussed risks benefits of arthrocentesis of the affected joint with the pt and he declined arthrocentesis at this time. Will reach out to podiatry.    0846 Reached out to podiatry on call to confirm plan of buddy tape for bony erosion, hard sole shoe, keflex, and Nsaids. Dr Quesada recommends short PO medrol taper with follow up in a few weeks.                                        Medical Decision Making  Ddx: fracture, contusion, cellulitis, abscess, septic arthritis,     Xray with no fracture, bony erosion  No palpable fluctuance. Pt able to move toe  Discussed risks, benefits alternatives to arthrocentesis with pt and pt declined procedure at this time.  Contacted podiatry who recommend methylprednisolone in addition to hard soled shoe, buddy tape, NSAIDs, and abx   Referral sent to OP podiatry.  Discussed return precautions and recommended follow up.      Amount and/or Complexity of Data Reviewed  Labs: ordered.  Radiology: ordered and independent interpretation performed.    Risk  OTC drugs.  Prescription drug management.          Disposition  Final diagnoses:   Great toe pain, right     Time reflects when diagnosis was documented in both MDM as applicable and the Disposition within this note       Time User Action Codes Description Comment    5/20/2024  8:54 AM Mary Villela [M79.674] Great toe pain, right           ED Disposition       ED Disposition   Discharge     Condition   Stable    Date/Time   Mon May 20, 2024  9:01 AM    Comment   Shaggy Rodriguez discharge to home/self care.                   Follow-up Information       Follow up With Specialties Details Why Contact Info Additional Information    [facility] Follow up with podiatry in 2 weeks [address]  [facility] [id number] [phone] [facility] [place] [address], [place] [address] [phone]    Claude Ireland, DO Sports Medicine, Orthopedic Surgery  You may follow up with your sports medicine doctor in 2 weeks. [address], [address]  [facility] [address] [phone]               Patient's Medications   Discharge Prescriptions    CEPHALEXIN (KEFLEX) 500 MG CAPSULE    Take 1 capsule (500 mg total) by mouth every 6 (six) hours for 5 days       Start Date: 5/20/2024 End Date: 5/25/2024       Order Dose: 500 mg       Quantity: 20 capsule    Refills: 0    METHYLPREDNISOLONE 4 MG TABLET THERAPY PACK    Use as directed on package       Start Date: 5/20/2024 End Date: --       Order Dose: --       Quantity: 21 tablet    Refills: 0    NAPROXEN (NAPROSYN) 500 MG TABLET    Take 1 tablet (500 mg total) by mouth 2 (two) times a day with meals       Start Date: 5/20/2024 End Date: --       Order Dose: 500 mg       Quantity: 30 tablet    Refills: 0    SUCRALFATE (CARAFATE) 1 G TABLET    Take 1 tablet (1 g total) by mouth in the morning for 10 doses Take once daily while taking Naproxen to prevention of gastritis       Start Date: 5/20/2024 End Date: 5/30/2024       Order Dose: 1 g       Quantity: 10 tablet    Refills: 0         PDMP Review       None             ED Provider  Attending physically available and evaluated Shaggy Rodriguez. I managed the patient along with the ED Attending.    Electronically Signed by           Mary Villela MD  05/23/24 2639

## 2024-05-21 LAB
B BURGDOR IGG SERPL QL IA: POSITIVE
B BURGDOR IGG+IGM SER QL IA: POSITIVE
B BURGDOR IGM SERPL QL IA: NEGATIVE

## 2024-05-22 ENCOUNTER — OFFICE VISIT (OUTPATIENT)
Age: 39
End: 2024-05-22
Payer: COMMERCIAL

## 2024-05-22 VITALS
SYSTOLIC BLOOD PRESSURE: 122 MMHG | DIASTOLIC BLOOD PRESSURE: 79 MMHG | HEIGHT: 70 IN | WEIGHT: 169 LBS | HEART RATE: 64 BPM | BODY MASS INDEX: 24.2 KG/M2

## 2024-05-22 DIAGNOSIS — S92.424A CLOSED NONDISPLACED FRACTURE OF DISTAL PHALANX OF RIGHT GREAT TOE, INITIAL ENCOUNTER: Primary | ICD-10-CM

## 2024-05-22 DIAGNOSIS — M79.674 GREAT TOE PAIN, RIGHT: ICD-10-CM

## 2024-05-22 PROCEDURE — 99203 OFFICE O/P NEW LOW 30 MIN: CPT | Performed by: STUDENT IN AN ORGANIZED HEALTH CARE EDUCATION/TRAINING PROGRAM

## 2024-05-22 RX ORDER — DOXYCYCLINE HYCLATE 100 MG/1
100 CAPSULE ORAL 2 TIMES DAILY
Qty: 28 CAPSULE | Refills: 0 | Status: SHIPPED | OUTPATIENT
Start: 2024-05-22 | End: 2024-06-05

## 2024-05-22 NOTE — RESULT ENCOUNTER NOTE
Patient returned call to the emergency department.  Name and date of birth uses positive patient identifiers.  Discussed Lyme results.  Discontinued Keflex and sent prescription for doxycycline to patient's pharmacy.

## 2024-05-22 NOTE — PROGRESS NOTES
This patient was seen on 5/22/2024.    My role is Foot , Ankle, and Wound Specialist    ASSESSMENT     Diagnoses and all orders for this visit:    Closed nondisplaced fracture of distal phalanx of right great toe, initial encounter    Great toe pain, right  -     Ambulatory Referral to Podiatry  -     Post Op Shoe         Problem List Items Addressed This Visit          Surgery/Wound/Pain    Great toe pain, right    Relevant Orders    Post Op Shoe     Other Visit Diagnoses       Closed nondisplaced fracture of distal phalanx of right great toe, initial encounter    -  Primary          PLAN  -Patient and I discussed their foot in detail  -Continue rest, ice, elevation  -Tylenol/Motrin as needed for pain  -Rx surgical shoe  -Return to clinic 4 weeks for repeat x-rays    X-ray from  5/20/24  of right great toe interpreted independently: Acute avulsion fracture of the plantar base of the great toe distal phalanx      SUBJECTIVE    Chief Complaint:  Right big toe pain     Patient ID: Shaggy Rodriguez     5/22/2024: Shaggy is a pleasant 38-year-old male who presents today with right big toe pain.  He states that his pain began last Tuesday, 5/14/2024.  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  He states that he did have an initial injury to the area last fall with the mechanism being a turf toe type injury.  He states that he was buddy taping it and that began to feel pretty good after this.  He states that since Tuesday he has had swelling and bruising to the area.  He went to the emergency department on 5/20/2024 where x-rays were taken revealing an avulsion fracture of the base of his first distal phalanx.        The following portions of the patient's history were reviewed and updated as appropriate: allergies, current medications, past family history, past medical history, past social history, past surgical history and problem list.    Review  "of Systems   Constitutional: Negative.    Respiratory: Negative.     Cardiovascular: Negative.    Gastrointestinal: Negative.    Genitourinary: Negative.    Musculoskeletal:  Positive for arthralgias.   Skin: Negative.          OBJECTIVE      /79   Pulse 64   Ht 5' 10\" (1.778 m)   Wt 76.7 kg (169 lb)   BMI 24.25 kg/m²        Physical Exam  Constitutional:       Appearance: Normal appearance.   HENT:      Head: Normocephalic and atraumatic.   Eyes:      General:         Right eye: No discharge.         Left eye: No discharge.   Cardiovascular:      Rate and Rhythm: Normal rate and regular rhythm.      Pulses:           Dorsalis pedis pulses are 2+ on the right side and 2+ on the left side.        Posterior tibial pulses are 2+ on the right side and 2+ on the left side.   Pulmonary:      Effort: Pulmonary effort is normal.      Breath sounds: Normal breath sounds.   Skin:     General: Skin is warm.      Capillary Refill: Capillary refill takes less than 2 seconds.   Neurological:      Mental Status: He is alert and oriented to person, place, and time.      Sensory: Sensation is intact. No sensory deficit.   Psychiatric:         Mood and Affect: Mood normal.         Vascular:  -DP and PT pulses intact b/l  -Capillary refill time <2 sec b/l  -Skin temp: WNL    MSK:  -Pain on palpation to right first digit at the level of the distal phalanx base  -No gross deformities noted   -MMT is 5/5 to all muscle compartments of the lower extremity  -Ankle dorsiflexion >10 degrees with knee extended and knee flexed b/l    Neuro:  -Light sensation intact bilaterally  -Protective sensation intact bilaterally    Derm:  -No lesions, abrasions, or open wounds noted  -No noted interdigital maceration, peeling, malodor  -No callus formation noted on exam  -Edema, ecchymosis not noted to the right foot        "

## 2024-06-19 ENCOUNTER — OFFICE VISIT (OUTPATIENT)
Age: 39
End: 2024-06-19
Payer: COMMERCIAL

## 2024-06-19 ENCOUNTER — APPOINTMENT (OUTPATIENT)
Dept: RADIOLOGY | Facility: CLINIC | Age: 39
End: 2024-06-19
Payer: COMMERCIAL

## 2024-06-19 VITALS
DIASTOLIC BLOOD PRESSURE: 73 MMHG | WEIGHT: 169 LBS | HEART RATE: 57 BPM | SYSTOLIC BLOOD PRESSURE: 113 MMHG | BODY MASS INDEX: 24.2 KG/M2 | HEIGHT: 70 IN

## 2024-06-19 DIAGNOSIS — M79.674 GREAT TOE PAIN, RIGHT: Primary | ICD-10-CM

## 2024-06-19 DIAGNOSIS — S92.424D CLOSED NONDISPLACED FRACTURE OF DISTAL PHALANX OF RIGHT GREAT TOE WITH ROUTINE HEALING, SUBSEQUENT ENCOUNTER: ICD-10-CM

## 2024-06-19 DIAGNOSIS — S92.424A CLOSED NONDISPLACED FRACTURE OF DISTAL PHALANX OF RIGHT GREAT TOE, INITIAL ENCOUNTER: ICD-10-CM

## 2024-06-19 PROCEDURE — 99213 OFFICE O/P EST LOW 20 MIN: CPT | Performed by: STUDENT IN AN ORGANIZED HEALTH CARE EDUCATION/TRAINING PROGRAM

## 2024-06-19 PROCEDURE — 73660 X-RAY EXAM OF TOE(S): CPT

## 2024-06-20 NOTE — PROGRESS NOTES
This patient was seen on 6/19/2024.    My role is Foot , Ankle, and Wound Specialist    ASSESSMENT     Diagnoses and all orders for this visit:    Great toe pain, right    Closed nondisplaced fracture of distal phalanx of right great toe with routine healing, subsequent encounter  -     XR toe right great min 2 view; Future         Problem List Items Addressed This Visit          Surgery/Wound/Pain    Great toe pain, right - Primary     Other Visit Diagnoses       Closed nondisplaced fracture of distal phalanx of right great toe with routine healing, subsequent encounter        Relevant Orders    XR toe right great min 2 view          PLAN  -Patient and I discussed their foot in detail  -Tylenol/Motrin as needed for pain  -Patient may return to supportive sneakers  -Return to clinic as needed for new or worsening podiatric concerns    X-ray from  6/19/24  of right great toe interpreted independently: Acute avulsion fracture of the plantar base of the great toe distal phalanx that was pictured previously is no longer evident.  This could be secondary to bone resorption versus angle of films.    SUBJECTIVE    Chief Complaint:  Right big toe pain     Patient ID: Shaggy REYEZ Michael     5/22/2024: Shaggy is a pleasant 38-year-old male who presents today with right big toe pain.  He states that his pain began last Tuesday, 5/14/2024.  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  He states that he did have an initial injury to the area last fall with the mechanism being a turf toe type injury.  He states that he was buddy taping it and that began to feel pretty good after this.  He states that since Tuesday he has had swelling and bruising to the area.  He went to the emergency department on 5/20/2024 where x-rays were taken revealing an avulsion fracture of the base of his first distal phalanx.    6/19/2024: Shaggy states that he is doing nearly 100%  "better today.  He denies pain to his right big toe.        The following portions of the patient's history were reviewed and updated as appropriate: allergies, current medications, past family history, past medical history, past social history, past surgical history and problem list.    Review of Systems   Constitutional: Negative.    Respiratory: Negative.     Cardiovascular: Negative.    Gastrointestinal: Negative.    Genitourinary: Negative.    Musculoskeletal:  Negative for arthralgias.   Skin: Negative.          OBJECTIVE      /73   Pulse 57   Ht 5' 10\" (1.778 m)   Wt 76.7 kg (169 lb)   BMI 24.25 kg/m²        Physical Exam  Constitutional:       Appearance: Normal appearance.   HENT:      Head: Normocephalic and atraumatic.   Eyes:      General:         Right eye: No discharge.         Left eye: No discharge.   Cardiovascular:      Rate and Rhythm: Normal rate and regular rhythm.      Pulses:           Dorsalis pedis pulses are 2+ on the right side and 2+ on the left side.        Posterior tibial pulses are 2+ on the right side and 2+ on the left side.   Pulmonary:      Effort: Pulmonary effort is normal.      Breath sounds: Normal breath sounds.   Skin:     General: Skin is warm.      Capillary Refill: Capillary refill takes less than 2 seconds.   Neurological:      Mental Status: He is alert and oriented to person, place, and time.      Sensory: Sensation is intact. No sensory deficit.   Psychiatric:         Mood and Affect: Mood normal.         Vascular:  -DP and PT pulses intact b/l  -Capillary refill time <2 sec b/l  -Skin temp: WNL    MSK:  -No pain on palpation noted today  -No gross deformities noted   -MMT is 5/5 to all muscle compartments of the lower extremity  -Ankle dorsiflexion >10 degrees with knee extended and knee flexed b/l    Neuro:  -Light sensation intact bilaterally  -Protective sensation intact bilaterally    Derm:  -No lesions, abrasions, or open wounds noted  -No noted " interdigital maceration, peeling, malodor  -No callus formation noted on exam  -Edema, ecchymosis not noted to the right foot

## 2024-10-16 ENCOUNTER — OFFICE VISIT (OUTPATIENT)
Dept: URGENT CARE | Facility: CLINIC | Age: 39
End: 2024-10-16
Payer: COMMERCIAL

## 2024-10-16 VITALS
OXYGEN SATURATION: 97 % | SYSTOLIC BLOOD PRESSURE: 100 MMHG | BODY MASS INDEX: 23.68 KG/M2 | RESPIRATION RATE: 14 BRPM | DIASTOLIC BLOOD PRESSURE: 66 MMHG | TEMPERATURE: 97.1 F | WEIGHT: 165 LBS | HEART RATE: 70 BPM

## 2024-10-16 DIAGNOSIS — R10.13 EPIGASTRIC PAIN: Primary | ICD-10-CM

## 2024-10-16 PROCEDURE — 99213 OFFICE O/P EST LOW 20 MIN: CPT | Performed by: PHYSICIAN ASSISTANT

## 2024-10-16 RX ORDER — DICYCLOMINE HCL 20 MG
20 TABLET ORAL 2 TIMES DAILY
Qty: 14 TABLET | Refills: 0 | Status: SHIPPED | OUTPATIENT
Start: 2024-10-16 | End: 2024-10-23

## 2024-10-16 NOTE — PROGRESS NOTES
"  [facility]        NAME: Shaggy Rodriguez is a 38 y.o. male  : 1985    MRN: [id number]  DATE: 2024  TIME: 4:19 PM    Assessment and Plan   Epigastric pain [R10.13]  1. Epigastric pain  dicyclomine (BENTYL) 20 mg tablet        Discussed trying Bentyl or Imodium over-the-counter.  If no improvement he should follow-up with his family doctor.  If symptoms worsen he will go to the emergency room.    Patient Instructions     Patient Instructions   Patient Education     Viral gastroenteritis in adults   The Basics   Written by the doctors and editors at [facility]   What is viral gastroenteritis? -- Viral gastroenteritis is an infection that can cause diarrhea and vomiting. It happens when a person's stomach and intestines get infected with a virus (figure 1). One of the most common causes of gastroenteritis is norovirus. But other viruses can cause it, too.  People can get viral gastroenteritis if they:   Touch an infected person or a surface with the virus on it, and then don't wash their hands   Eat foods or drink liquids with the virus in them. If people with the virus don't wash their hands, they can spread it to food or liquids they touch.  What are the symptoms of viral gastroenteritis? -- The infection causes diarrhea and vomiting. People can have either diarrhea or vomiting, or both. These symptoms usually start suddenly, and can be severe.  Viral gastroenteritis can also cause:   Fever   Headache or muscle aches   Belly pain or cramping   Loss of appetite  If you have a lot of diarrhea and vomiting, your body can lose too much water. This is known as \"dehydration.\" Dehydration can make you feel thirsty, tired, dizzy, or even confused. It can also make your urine look dark yellow.  Severe dehydration can be life-threatening. Older people are more likely to get severe dehydration.  Will I need tests? -- Not usually. Your doctor or nurse should be able to tell if you have viral " "gastroenteritis by learning about your symptoms and doing an exam. But the doctor or nurse might do tests to check for dehydration or to see which virus is causing the infection. These tests can include:   Blood tests   Urine tests   Tests on a sample of bowel movement  Is there anything I can do on my own to feel better? -- Yes. You need to replace the body's fluids that are lost through vomiting and diarrhea:   Drink fluids when you are able. It might help to take small sips every 15 to 30 minutes. Try to drink more as you start to feel better.   When you have a lot of vomiting or diarrhea, your body loses both water and salt. Drinking fluids that contain some salt can help replace what your body has lost. Examples include \"oral rehydration solutions,\" sports drinks, and broth. If you drink a lot of plain water, make sure you are also eating. This will help your body keep the right salt and water balance.   Avoid drinks with a lot of sugar, like juice or soda. Avoid alcohol, too.   Eat when you are able. If you can keep food down, it's best to eat lean meats, fruits, vegetables, and whole-grain breads and cereals. Avoid eating foods with a lot of fat or sugar, which can make symptoms worse.  If you are an adult younger than 65 and you have a new bout of diarrhea, and no fever and no blood in your bowel movements, you can take medicine to stop diarrhea such as loperamide (brand name: Imodium) for 1 to 2 days. But if you are older than 65, have a fever, or have blood in your bowel movements, do not take these medicines without checking with your doctor.  If you have diabetes, you might need to check your blood sugar more often until you feel better. Ask your doctor or nurse about this.  Should I call the doctor or nurse? -- Call the doctor or nurse if you:   Have any symptoms of dehydration, like feeling very tired, thirsty, dizzy, or confused   Have diarrhea or vomiting that lasts longer than a few days   Vomit up " "blood, have bloody diarrhea, or have severe belly pain   Haven't been able to drink anything for many hours   Haven't needed to urinate in the past 6 to 8 hours (during the day)  How is viral gastroenteritis treated? -- Most people do not need any treatment, because their symptoms will get better on their own. But people with severe dehydration might need treatment in the hospital. This involves getting fluids through a thin tube that goes into a vein, called an \"IV.\"  Doctors do not treat viral gastroenteritis with antibiotics. That's because antibiotics treat infections that are caused by bacteria, not viruses.  Can viral gastroenteritis be prevented? -- Sometimes. To lower the chance of getting or spreading the infection, wash your hands well with soap and water:   After you use the bathroom   Before you eat   Before you prepare food  Also, if you are caring for a child in diapers, wash your hands well after changing diapers. Do not change diapers near where you cook or eat food.  All topics are updated as new evidence becomes available and our peer review process is complete.  This topic retrieved from [employer] on: Mar 06, 2024.  Topic 98408 Version 17.0  Release: 32.2.4 - C32.64  © 2024 UpToDate, Inc. and/or its affiliates. All rights reserved.  figure 1: Digestive system     This drawing shows the organs in the body that process food. Together, these organs are called the \"digestive system\" or \"digestive tract.\" As food travels through this system, the body absorbs nutrients and water.  Graphic 73431 Version 5.0  Consumer Information Use and Disclaimer   Disclaimer: This generalized information is a limited summary of diagnosis, treatment, and/or medication information. It is not meant to be comprehensive and should be used as a tool to help the user understand and/or assess potential diagnostic and treatment options. It does NOT include all information about conditions, treatments, medications, side effects, " or risks that may apply to a specific patient. It is not intended to be medical advice or a substitute for the medical advice, diagnosis, or treatment of a health care provider based on the health care provider's examination and assessment of a patient's specific and unique circumstances. Patients must speak with a health care provider for complete information about their health, medical questions, and treatment options, including any risks or benefits regarding use of medications. This information does not endorse any treatments or medications as safe, effective, or approved for treating a specific patient. UpToDate, Inc. and its affiliates disclaim any warranty or liability relating to this information or the use thereof.The use of this information is governed by the Terms of Use, available at https://www.[employer].com/en/know/clinical-effectiveness-terms. 2024© UpToDate, Inc. and its affiliates and/or licensors. All rights reserved.  Copyright   © 2024 UpToDate, Inc. and/or its affiliates. All rights reserved.        Follow up with PCP in 3-5 days.  Proceed to  ER if symptoms worsen.    Chief Complaint     Chief Complaint   Patient presents with    Abdominal Pain     Pt presents with mid upper gastric pain that started 1-2 weeks ago/ diarrhea started one week ago; Pepto Bismol did not help         History of Present Illness       Patient is a 38-year-old male presenting to the urgent care for diarrhea x 1 week.  He reports 3-4 episodes of nonbloody diarrhea per day for the last week.  Reports cramping epigastric abdominal pain for the same timeframe.  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  He has tried Pepcid for the last 2 days to no relief.  He denies any sick contacts, travel, or abnormal food intake.  He denies any chest pain, urinarychanges, shortness of breath, fevers, fatigue, chills, or abdominal pain.         Review of  Systems   Review of Systems   Constitutional:  Negative for activity change, appetite change, chills, diaphoresis and fever.   HENT:  Negative for congestion, ear pain, rhinorrhea and sore throat.    Eyes:  Negative for pain and visual disturbance.   Respiratory:  Negative for cough, chest tightness and shortness of breath.    Cardiovascular:  Negative for chest pain and palpitations.   Gastrointestinal:  Positive for abdominal pain and diarrhea. Negative for nausea and vomiting.   Genitourinary:  Negative for dysuria and hematuria.   Musculoskeletal:  Negative for arthralgias, back pain and myalgias.   Skin:  Negative for color change, pallor and rash.   Neurological:  Negative for seizures, syncope and headaches.   All other systems reviewed and are negative.        Current Medications       Current Outpatient Medications:     cetirizine (ZyrTEC) 10 mg tablet, Take 10 mg by mouth daily as needed for allergies, Disp: , Rfl:     dicyclomine (BENTYL) 20 mg tablet, Take 1 tablet (20 mg total) by mouth 2 (two) times a day for 7 days, Disp: 14 tablet, Rfl: 0    baclofen 20 mg tablet, Take 1 tablet (20 mg total) by mouth 3 (three) times a day (Patient not taking: Reported on 5/15/2024), Disp: 15 tablet, Rfl: 0    brompheniramine-pseudoephedrine-DM 30-2-10 MG/5ML syrup, Take 10 mL by mouth 4 (four) times a day as needed for congestion or cough (Patient not taking: Reported on 5/15/2024), Disp: 118 mL, Rfl: 0    colchicine (COLCRYS) 0.6 mg tablet, Take 2 pills, then one hour later take remaining 1 pill (Patient not taking: Reported on 10/16/2024), Disp: 3 tablet, Rfl: 0    fluticasone (FLONASE) 50 mcg/act nasal spray, 1 spray into each nostril daily (Patient not taking: Reported on 3/13/2023), Disp: 18.2 mL, Rfl: 0    methylPREDNISolone 4 MG tablet therapy pack, Use as directed on package (Patient not taking: Reported on 10/16/2024), Disp: 21 tablet, Rfl: 0    naproxen (Naprosyn) 500 mg tablet, Take 1 tablet (500 mg total)  by mouth 2 (two) times a day with meals (Patient not taking: Reported on 5/15/2024), Disp: 30 tablet, Rfl: 0    naproxen (Naprosyn) 500 mg tablet, Take 1 tablet (500 mg total) by mouth 2 (two) times a day with meals (Patient not taking: Reported on 10/16/2024), Disp: 30 tablet, Rfl: 0    pseudoephedrine (SUDAFED) 120 MG 12 hr tablet, Take 1 tablet (120 mg total) by mouth 2 (two) times a day (Patient not taking: Reported on 3/13/2023), Disp: 14 tablet, Rfl: 0    sucralfate (CARAFATE) 1 g tablet, Take 1 tablet (1 g total) by mouth in the morning for 10 doses Take once daily while taking Naproxen to prevention of gastritis, Disp: 10 tablet, Rfl: 0    Current Allergies     Allergies as of 10/16/2024 - Reviewed 10/16/2024   Allergen Reaction Noted    Baclofen Other (See Comments) 05/15/2024            The following portions of the patient's history were reviewed and updated as appropriate: allergies, current medications, past family history, past medical history, past social history, past surgical history and problem list.     Past Medical History:   Diagnosis Date    Allergic     Allergic rhinitis     Frequent headaches        Past Surgical History:   Procedure Laterality Date    COLONOSCOPY      WISDOM TOOTH EXTRACTION         History reviewed. No pertinent family history.      Medications have been verified.        Objective   /66   Pulse 70   Temp (!) 97.1 °F (36.2 °C)   Resp 14   Wt 74.8 kg (165 lb)   SpO2 97%   BMI 23.68 kg/m²        Physical Exam     Physical Exam  Vitals and nursing note reviewed.   Constitutional:       General: He is not in acute distress.     Appearance: Normal appearance. He is well-developed and normal weight. He is not ill-appearing, toxic-appearing or diaphoretic.   HENT:      Head: Normocephalic and atraumatic.      Nose: Nose normal. No congestion or rhinorrhea.   Cardiovascular:      Rate and Rhythm: Normal rate and regular rhythm.      Heart sounds: Normal heart sounds. No  murmur heard.     No friction rub. No gallop.   Pulmonary:      Effort: Pulmonary effort is normal. No respiratory distress.      Breath sounds: Normal breath sounds. No stridor. No wheezing, rhonchi or rales.   Chest:      Chest wall: No tenderness.   Abdominal:      General: Abdomen is flat. Bowel sounds are normal. There is no distension.      Palpations: Abdomen is soft.      Tenderness: There is no abdominal tenderness. There is no right CVA tenderness, left CVA tenderness, guarding or rebound. Negative signs include Mcnally's sign, Rovsing's sign, McBurney's sign, psoas sign and obturator sign.   Musculoskeletal:      Cervical back: Normal range of motion.   Lymphadenopathy:      Cervical: No cervical adenopathy.   Skin:     General: Skin is warm and dry.      Capillary Refill: Capillary refill takes less than 2 seconds.   Neurological:      Mental Status: He is alert.

## 2024-10-16 NOTE — PATIENT INSTRUCTIONS
"Patient Education     Viral gastroenteritis in adults   The Basics   Written by the doctors and editors at [facility]   What is viral gastroenteritis? -- Viral gastroenteritis is an infection that can cause diarrhea and vomiting. It happens when a person's stomach and intestines get infected with a virus (figure 1). One of the most common causes of gastroenteritis is norovirus. But other viruses can cause it, too.  People can get viral gastroenteritis if they:   Touch an infected person or a surface with the virus on it, and then don't wash their hands   Eat foods or drink liquids with the virus in them. If people with the virus don't wash their hands, they can spread it to food or liquids they touch.  What are the symptoms of viral gastroenteritis? -- The infection causes diarrhea and vomiting. People can have either diarrhea or vomiting, or both. These symptoms usually start suddenly, and can be severe.  Viral gastroenteritis can also cause:   Fever   Headache or muscle aches   Belly pain or cramping   Loss of appetite  If you have a lot of diarrhea and vomiting, your body can lose too much water. This is known as \"dehydration.\" Dehydration can make you feel thirsty, tired, dizzy, or even confused. It can also make your urine look dark yellow.  Severe dehydration can be life-threatening. Older people are more likely to get severe dehydration.  Will I need tests? -- Not usually. Your doctor or nurse should be able to tell if you have viral gastroenteritis by learning about your symptoms and doing an exam. But the doctor or nurse might do tests to check for dehydration or to see which virus is causing the infection. These tests can include:   Blood tests   Urine tests   Tests on a sample of bowel movement  Is there anything I can do on my own to feel better? -- Yes. You need to replace the body's fluids that are lost through vomiting and diarrhea:   Drink fluids when you are able. It might help to take small sips " "every 15 to 30 minutes. Try to drink more as you start to feel better.   When you have a lot of vomiting or diarrhea, your body loses both water and salt. Drinking fluids that contain some salt can help replace what your body has lost. Examples include \"oral rehydration solutions,\" sports drinks, and broth. If you drink a lot of plain water, make sure you are also eating. This will help your body keep the right salt and water balance.   Avoid drinks with a lot of sugar, like juice or soda. Avoid alcohol, too.   Eat when you are able. If you can keep food down, it's best to eat lean meats, fruits, vegetables, and whole-grain breads and cereals. Avoid eating foods with a lot of fat or sugar, which can make symptoms worse.  If you are an adult younger than 65 and you have a new bout of diarrhea, and no fever and no blood in your bowel movements, you can take medicine to stop diarrhea such as loperamide (brand name: Imodium) for 1 to 2 days. But if you are older than 65, have a fever, or have blood in your bowel movements, do not take these medicines without checking with your doctor.  If you have diabetes, you might need to check your blood sugar more often until you feel better. Ask your doctor or nurse about this.  Should I call the doctor or nurse? -- Call the doctor or nurse if you:   Have any symptoms of dehydration, like feeling very tired, thirsty, dizzy, or confused   Have diarrhea or vomiting that lasts longer than a few days   Vomit up blood, have bloody diarrhea, or have severe belly pain   Haven't been able to drink anything for many hours   Haven't needed to urinate in the past 6 to 8 hours (during the day)  How is viral gastroenteritis treated? -- Most people do not need any treatment, because their symptoms will get better on their own. But people with severe dehydration might need treatment in the hospital. This involves getting fluids through a thin tube that goes into a vein, called an \"IV.\"  Doctors " "do not treat viral gastroenteritis with antibiotics. That's because antibiotics treat infections that are caused by bacteria, not viruses.  Can viral gastroenteritis be prevented? -- Sometimes. To lower the chance of getting or spreading the infection, wash your hands well with soap and water:   After you use the bathroom   Before you eat   Before you prepare food  Also, if you are caring for a child in diapers, wash your hands well after changing diapers. Do not change diapers near where you cook or eat food.  All topics are updated as new evidence becomes available and our peer review process is complete.  This topic retrieved from [employer] on: Mar 06, 2024.  Topic 29104 Version 17.0  Release: 32.2.4 - C32.64  © 2024 UpToDate, Inc. and/or its affiliates. All rights reserved.  figure 1: Digestive system     This drawing shows the organs in the body that process food. Together, these organs are called the \"digestive system\" or \"digestive tract.\" As food travels through this system, the body absorbs nutrients and water.  Graphic 20755 Version 5.0  Consumer Information Use and Disclaimer   Disclaimer: This generalized information is a limited summary of diagnosis, treatment, and/or medication information. It is not meant to be comprehensive and should be used as a tool to help the user understand and/or assess potential diagnostic and treatment options. It does NOT include all information about conditions, treatments, medications, side effects, or risks that may apply to a specific patient. It is not intended to be medical advice or a substitute for the medical advice, diagnosis, or treatment of a health care provider based on the health care provider's examination and assessment of a patient's specific and unique circumstances. Patients must speak with a health care provider for complete information about their health, medical questions, and treatment options, including any risks or benefits regarding use of " medications. This information does not endorse any treatments or medications as safe, effective, or approved for treating a specific patient. UpToDate, Inc. and its affiliates disclaim any warranty or liability relating to this information or the use thereof.The use of this information is governed by the Terms of Use, available at https://www.[employer].com/en/know/clinical-effectiveness-terms. 2024© UpToDate, Inc. and its affiliates and/or licensors. All rights reserved.  Copyright   © 2024 UpToDate, Inc. and/or its affiliates. All rights reserved.

## 2025-02-25 ENCOUNTER — OFFICE VISIT (OUTPATIENT)
Dept: UROLOGY | Facility: CLINIC | Age: 40
End: 2025-02-25
Payer: COMMERCIAL

## 2025-02-25 VITALS
WEIGHT: 172 LBS | BODY MASS INDEX: 24.62 KG/M2 | HEIGHT: 70 IN | SYSTOLIC BLOOD PRESSURE: 104 MMHG | OXYGEN SATURATION: 98 % | DIASTOLIC BLOOD PRESSURE: 80 MMHG | HEART RATE: 74 BPM

## 2025-02-25 DIAGNOSIS — Z30.09 VASECTOMY EVALUATION: Primary | ICD-10-CM

## 2025-02-25 PROCEDURE — 99203 OFFICE O/P NEW LOW 30 MIN: CPT | Performed by: UROLOGY

## 2025-02-25 NOTE — PROGRESS NOTES
Assessment/Plan:   Desires sterility by vasectomy.  Risks and benefits of this and other options discussed and all questions answered. Brochure on vasectomy reviewed and provided for patient to keep.    Vasectomy to be arranged to maximize time off work if has a strenuous job. Aware to avoid such activity and intercourse even as long as the area is painful or tender.  Per medicaid policy, some scheduling modification may be required so that the vasectomy is performed no sooner than 30 days after consent is signed.         Subjective:   02/25/25 :    Chief complaint:  Patient is a 39 y.o. male who presents for a discussion regarding family planning. Specifically to discuss vasectomy.    HPI:  2 children. Understands from brochure and discussion that this is ostensibly not a reversible procedure.  Can be expensive and not 100% successful to reverse.    Occupation: .    We had a long discussion regarding the options for birth control.  He understands that vasectomy confers no immunity to STDs.  I also told him that according to our present knowledge, there is no causal relationship between vasectomy and subsequent development of prostate cancer.    We spoke about the potential complications.  The most common one in the short term is scrotal hematoma and infection, which rarely requires re-operation.  Additionally, he can react to the anesthetic, develop scrotal swelling, have pain or skin bruising.  We spoke about post procedure care to try to minimize these complications.  I also asked him to refrain from aspirin products, NSAIDs like ibuprofen, fish oil and the like, and alcohol prior to and following the procedure.  The long-term complications include but are not limited to vasectomy failure by recanalization, chronic epididymal discomfort, pain, among other possibilities.  If he has a strenuous job, he was made aware that he might want to avoid such activity until the  area is not tender to touch,.  If there is  still significant swelling and tenderness, strenuous activity will likely make things worse.    I described to him how this procedure is normally performed in an office setting and he understands that if anything other than local anesthesia is desired, this can be performed for him in an outpatient operative setting.  Finally, he understands that following vasectomy, he'll need to use other means of birth control until he's had a semen analysis that demonstrate the absence of sperm.  He understands it will be his responsibility to submit this semen specimen and call our office or go onto his mychart for the results.  I told him again that recanalization is a small but real possibility, and if he is ever concerned about it he can submit another semen specimen for analysis.  After discussing the risks, benefits, possible complications and alternatives, informed consent was obtained.  He understands that we require 30 days waiting time, if insurance or state law dictates, after consent is obtained to perform the vasectomy.    PFHx/PSHx/PMHx/PsocHx/Allergies/Meds:  The following portions of the patient's history were reviewed and updated as appropriate: allergies, current medications, past family history, past medical history, past social history, past surgical history and problem list.    Review of Systems:   See HPI.  All other systems are negative except for what is mentioned here: None          Labs:   I reviewed most recent testing:  Results for orders placed or performed during the hospital encounter of 05/20/24   Sedimentation rate, automated   Result Value Ref Range    Sed Rate 13 0 - 14 mm/hour   Lyme Total AB W Reflex to IGM/IGG   Result Value Ref Range    Lyme Total Antibodies Positive (A) Negative   Lyme IGM (Reflex Only-Do Not Order)   Result Value Ref Range    Lyme IGM Negative Negative   Lyme IGG (Reflex Only-Do Not Order)   Result Value Ref Range    Lyme IGG Positive (A) Negative       Lab Results  "  Component Value Date    WBC 8.51 03/13/2023    HGB 16.3 03/13/2023    HCT 46.5 03/13/2023    MCV 91 03/13/2023     03/13/2023         Objective:      /80 (BP Location: Left arm, Patient Position: Sitting, Cuff Size: Adult)   Pulse 74   Ht 5' 10\" (1.778 m)   Wt 78 kg (172 lb)   SpO2 98%   BMI 24.68 kg/m²   Physical Exam   Physical Exam  Genitourinary:     Testes: Normal.      Comments: Bilateral vasa present, one each side. No hernia present.         "

## 2025-02-27 ENCOUNTER — OFFICE VISIT (OUTPATIENT)
Dept: FAMILY MEDICINE CLINIC | Facility: CLINIC | Age: 40
End: 2025-02-27
Payer: COMMERCIAL

## 2025-02-27 VITALS
RESPIRATION RATE: 16 BRPM | SYSTOLIC BLOOD PRESSURE: 116 MMHG | TEMPERATURE: 97.6 F | BODY MASS INDEX: 24.34 KG/M2 | DIASTOLIC BLOOD PRESSURE: 70 MMHG | OXYGEN SATURATION: 98 % | HEIGHT: 70 IN | HEART RATE: 71 BPM | WEIGHT: 170 LBS

## 2025-02-27 DIAGNOSIS — R53.83 OTHER FATIGUE: ICD-10-CM

## 2025-02-27 DIAGNOSIS — M25.50 ARTHRALGIA, UNSPECIFIED JOINT: Primary | ICD-10-CM

## 2025-02-27 DIAGNOSIS — Z13.1 SCREENING FOR DIABETES MELLITUS: ICD-10-CM

## 2025-02-27 DIAGNOSIS — Z13.220 SCREENING FOR LIPID DISORDERS: ICD-10-CM

## 2025-02-27 DIAGNOSIS — Z76.89 ENCOUNTER TO ESTABLISH CARE: ICD-10-CM

## 2025-02-27 PROCEDURE — 99203 OFFICE O/P NEW LOW 30 MIN: CPT | Performed by: STUDENT IN AN ORGANIZED HEALTH CARE EDUCATION/TRAINING PROGRAM

## 2025-02-27 NOTE — PROGRESS NOTES
"Name: Shaggy Rodriguez      : 1985      MRN: [id number]  Encounter Provider: Marium Jarrett MD  Encounter Date: 2025   Encounter department: [facility]  :  Assessment & Plan  Arthralgia, unspecified joint    Orders:  •  CBC and Platelet; Future  •  Comprehensive Metabolic Panel; Future  •  RF Screen w/ Reflex to Titer; Future  •  Cyclic citrul peptide antibody, IgG; Future  •  Sjogren's Antibodies; Future  •  Uric acid; Future  •  Lyme Total AB W Reflex to IGM/IGG; Future  •  C-reactive protein; Future    Encounter to establish care         Screening for lipid disorders    Orders:  •  Lipid Panel with Direct LDL reflex; Future    Screening for diabetes mellitus    Orders:  •  Hemoglobin A1C; Future    Other fatigue    Orders:  •  CBC and Platelet; Future  •  Comprehensive Metabolic Panel; Future  •  TSH, 3rd generation with Free T4 reflex; Future           History of Present Illness   HPI    Patient presents to establish care.  He notes that he continues to have arthralgias from having Lyme disease early last year.  He has completed the doxycycline treatment.  He has a personal history of colonic polyps.  He does follow with GI.  Patient has no other concerns today.    Review of Systems   Constitutional:  Negative for activity change, appetite change, chills, fatigue and fever.   HENT:  Negative for congestion.    Respiratory:  Negative for cough, shortness of breath and wheezing.    Cardiovascular:  Negative for chest pain, palpitations and leg swelling.   Gastrointestinal:  Negative for abdominal pain, constipation, diarrhea, nausea and vomiting.   Skin:  Negative for rash.   Neurological:  Negative for light-headedness and headaches.   Psychiatric/Behavioral:  The patient is not nervous/anxious.        Objective   /70 (BP Location: Left arm, Patient Position: Sitting, Cuff Size: Standard)   Pulse 71   Temp 97.6 °F (36.4 °C) (Temporal)   Resp 16   Ht 5' 10\" " (1.778 m)   Wt 77.1 kg (170 lb)   SpO2 98%   BMI 24.39 kg/m²      Physical Exam  Constitutional:       Appearance: Normal appearance.   HENT:      Head: Normocephalic and atraumatic.   Cardiovascular:      Rate and Rhythm: Normal rate and regular rhythm.      Pulses: Normal pulses.      Heart sounds: Normal heart sounds.   Pulmonary:      Effort: Pulmonary effort is normal.      Breath sounds: Normal breath sounds.   Neurological:      General: No focal deficit present.      Mental Status: He is alert and oriented to person, place, and time.   Psychiatric:         Mood and Affect: Mood normal.         Behavior: Behavior normal.         Thought Content: Thought content normal.         Judgment: Judgment normal.

## 2025-03-04 ENCOUNTER — APPOINTMENT (OUTPATIENT)
Dept: LAB | Facility: CLINIC | Age: 40
End: 2025-03-04
Payer: COMMERCIAL

## 2025-03-04 DIAGNOSIS — Z13.220 SCREENING FOR LIPID DISORDERS: ICD-10-CM

## 2025-03-04 DIAGNOSIS — R53.83 OTHER FATIGUE: ICD-10-CM

## 2025-03-04 DIAGNOSIS — M25.50 ARTHRALGIA, UNSPECIFIED JOINT: ICD-10-CM

## 2025-03-04 DIAGNOSIS — Z13.1 SCREENING FOR DIABETES MELLITUS: ICD-10-CM

## 2025-03-04 PROCEDURE — 80053 COMPREHEN METABOLIC PANEL: CPT

## 2025-03-04 PROCEDURE — 86235 NUCLEAR ANTIGEN ANTIBODY: CPT

## 2025-03-04 PROCEDURE — 84550 ASSAY OF BLOOD/URIC ACID: CPT

## 2025-03-04 PROCEDURE — 86140 C-REACTIVE PROTEIN: CPT

## 2025-03-04 PROCEDURE — 84443 ASSAY THYROID STIM HORMONE: CPT

## 2025-03-04 PROCEDURE — 86617 LYME DISEASE ANTIBODY: CPT

## 2025-03-04 PROCEDURE — 86200 CCP ANTIBODY: CPT

## 2025-03-04 PROCEDURE — 80061 LIPID PANEL: CPT

## 2025-03-04 PROCEDURE — 36415 COLL VENOUS BLD VENIPUNCTURE: CPT

## 2025-03-04 PROCEDURE — 86430 RHEUMATOID FACTOR TEST QUAL: CPT

## 2025-03-04 PROCEDURE — 85027 COMPLETE CBC AUTOMATED: CPT

## 2025-03-04 PROCEDURE — 86618 LYME DISEASE ANTIBODY: CPT

## 2025-03-04 PROCEDURE — 83036 HEMOGLOBIN GLYCOSYLATED A1C: CPT

## 2025-03-05 LAB
ALBUMIN SERPL BCG-MCNC: 4.7 G/DL (ref 3.5–5)
ALP SERPL-CCNC: 62 U/L (ref 34–104)
ALT SERPL W P-5'-P-CCNC: 67 U/L (ref 7–52)
ANION GAP SERPL CALCULATED.3IONS-SCNC: 6 MMOL/L (ref 4–13)
AST SERPL W P-5'-P-CCNC: 38 U/L (ref 13–39)
B BURGDOR IGG+IGM SER QL IA: POSITIVE
BILIRUB SERPL-MCNC: 0.61 MG/DL (ref 0.2–1)
BUN SERPL-MCNC: 13 MG/DL (ref 5–25)
CALCIUM SERPL-MCNC: 9.4 MG/DL (ref 8.4–10.2)
CHLORIDE SERPL-SCNC: 103 MMOL/L (ref 96–108)
CHOLEST SERPL-MCNC: 214 MG/DL (ref ?–200)
CO2 SERPL-SCNC: 31 MMOL/L (ref 21–32)
CREAT SERPL-MCNC: 1.03 MG/DL (ref 0.6–1.3)
CRP SERPL QL: 3.6 MG/L
ERYTHROCYTE [DISTWIDTH] IN BLOOD BY AUTOMATED COUNT: 11.7 % (ref 11.6–15.1)
EST. AVERAGE GLUCOSE BLD GHB EST-MCNC: 117 MG/DL
GFR SERPL CREATININE-BSD FRML MDRD: 92 ML/MIN/1.73SQ M
GLUCOSE P FAST SERPL-MCNC: 87 MG/DL (ref 65–99)
HBA1C MFR BLD: 5.7 %
HCT VFR BLD AUTO: 51.1 % (ref 36.5–49.3)
HDLC SERPL-MCNC: 55 MG/DL
HGB BLD-MCNC: 16.7 G/DL (ref 12–17)
LDLC SERPL CALC-MCNC: 136 MG/DL (ref 0–100)
MCH RBC QN AUTO: 29.7 PG (ref 26.8–34.3)
MCHC RBC AUTO-ENTMCNC: 32.7 G/DL (ref 31.4–37.4)
MCV RBC AUTO: 91 FL (ref 82–98)
PLATELET # BLD AUTO: 200 THOUSANDS/UL (ref 149–390)
PMV BLD AUTO: 12.2 FL (ref 8.9–12.7)
POTASSIUM SERPL-SCNC: 4.3 MMOL/L (ref 3.5–5.3)
PROT SERPL-MCNC: 7.5 G/DL (ref 6.4–8.4)
RBC # BLD AUTO: 5.62 MILLION/UL (ref 3.88–5.62)
RHEUMATOID FACT SER QL LA: NEGATIVE
SODIUM SERPL-SCNC: 140 MMOL/L (ref 135–147)
TRIGL SERPL-MCNC: 114 MG/DL (ref ?–150)
TSH SERPL DL<=0.05 MIU/L-ACNC: 2.84 UIU/ML (ref 0.45–4.5)
URATE SERPL-MCNC: 7.1 MG/DL (ref 3.5–8.5)
WBC # BLD AUTO: 4.54 THOUSAND/UL (ref 4.31–10.16)

## 2025-03-06 LAB
B BURGDOR IGG SERPL QL IA: POSITIVE
B BURGDOR IGM SERPL QL IA: NEGATIVE

## 2025-03-07 LAB
CCP AB SER IA-ACNC: 1.2 (ref ?–10)
ENA SS-A AB SER IA-ACNC: <0.5 U/ML (ref ?–10)
ENA SS-B IGG SER IA-ACNC: <0.6 U/ML (ref ?–10)

## 2025-03-20 ENCOUNTER — RA CDI HCC (OUTPATIENT)
Dept: OTHER | Facility: HOSPITAL | Age: 40
End: 2025-03-20

## 2025-03-20 NOTE — PROGRESS NOTES
HCC coding opportunities       Chart reviewed, no opportunity found: CHART REVIEWED, NO OPPORTUNITY FOUND        Patients Insurance        Commercial Insurance: [employer]

## 2025-07-22 ENCOUNTER — PROCEDURE VISIT (OUTPATIENT)
Dept: UROLOGY | Facility: CLINIC | Age: 40
End: 2025-07-22
Payer: COMMERCIAL

## 2025-07-22 VITALS
HEIGHT: 70 IN | SYSTOLIC BLOOD PRESSURE: 112 MMHG | WEIGHT: 161 LBS | HEART RATE: 68 BPM | BODY MASS INDEX: 23.05 KG/M2 | DIASTOLIC BLOOD PRESSURE: 70 MMHG | OXYGEN SATURATION: 98 %

## 2025-07-22 DIAGNOSIS — Z30.2 ENCOUNTER FOR STERILIZATION: Primary | ICD-10-CM

## 2025-07-22 PROCEDURE — 55250 REMOVAL OF SPERM DUCT(S): CPT | Performed by: UROLOGY

## 2025-07-22 NOTE — PROGRESS NOTES
Vasectomy     Date/Time  7/22/2025 9:00 AM     Performed by  Dick Traylor MD   Authorized by  Dick Traylor MD     Universal Protocol   Consent: Written consent obtained  Consent given by: patient  Patient understanding: patient states understanding of the procedure being performed  Patient consent: the patient's understanding of the procedure matches consent given  Procedure consent: procedure consent matches procedure scheduled  Patient identity confirmed: verbally with patient      Local anesthesia used: yes      Anesthesia: local infiltration     Anesthesia   Local anesthesia used: yes  Local Anesthetic: lidocaine 2% without epinephrine     Sedation   Patient sedated: no        Specimen: no    Culture: no   Procedure Details   Procedure Notes: Procedure - Bilateral Vasectomy  Pre-operative Dx: [place] male  Post-operative Dx: same  Procedure: Bilateral vasectomy  Anesthesia: Local, 2% lidocaine 3mL  Blood Loss:  0mL  Disposition: Home  Indications:  Patient desires vasectomy for purposes of contraception. The risks of such a procedure were discussed with the patient. They included but were not limited to bleeding, infection, failure to produce sterility, pain (acute and chronic), need for reoperation, and loss of the testicle(s). He understood the risks, had the opportunity to ask questions, and agreed freely to the procedure. No guarantee was made as to outcome.    Procedure in Detail:   After obtaining consent, the patient was brought to the procedure room where he was placed on the table in the supine position. The scrotum was shaved by the patient at home in preparation for the procedure. The area was prepped and draped in the usual sterile fashion. Time-out performed and noted in chart.     The vas was palpated in the right hemiscrotum and pressed to the skin where anesthesia was injected into the skin and along the vas. An incision was made superficial to the vas and using a hemostat the  perivasal tissues were dissected from around the vas. The vas was grasped with a vas clamp, the vasal sheath incised, and the vas grasped with a towel clip. The vas was pulled through the incision and further anesthetized.  It was doubly suture-ligated with 3-0 chromic suture, divided, and the lumens cauterized.  Hemostasis was confirmed and the vas was returned to the scrotum. The skin was approximated with a single figure of eight 3-0 chromic suture. The exact procedure was performed on the left side.  The scrotal incisions were covered with neosporin ointment and sterile gauze and the patient was allowed to get dressed.          He was instructed to keep the incisions clean and dry for 24 hours.  No soap or peroxide for 3 days.  He was instructed to apply antibiotic ointment to the incisions 2-3 times per day for 3 days.  He was instructed not to have unprotected intercourse until he was notified by my office. He understood he was still fertile until he produced one/two azoospermic sample(s), at least one week apart.  He was to perform no exercise, sex or heavy lifting until all the pain and swelling were gone from the testicles. He was to wear a scrotal support for one week after the pain and swelling subsided. He was to call or come in if he had any fevers, chills, progressive enlargement of the scrotum, or intractable pain.  Patient tolerance: patient tolerated the procedure well with no immediate complications